# Patient Record
Sex: FEMALE | Race: WHITE | Employment: FULL TIME | URBAN - METROPOLITAN AREA
[De-identification: names, ages, dates, MRNs, and addresses within clinical notes are randomized per-mention and may not be internally consistent; named-entity substitution may affect disease eponyms.]

---

## 2017-02-17 ENCOUNTER — OFFICE VISIT (OUTPATIENT)
Dept: OBGYN CLINIC | Facility: CLINIC | Age: 42
End: 2017-02-17

## 2017-02-17 VITALS
SYSTOLIC BLOOD PRESSURE: 138 MMHG | WEIGHT: 178 LBS | DIASTOLIC BLOOD PRESSURE: 88 MMHG | HEIGHT: 65 IN | BODY MASS INDEX: 29.66 KG/M2

## 2017-02-17 DIAGNOSIS — D25.1 FIBROIDS, INTRAMURAL: Primary | ICD-10-CM

## 2017-02-17 PROBLEM — F43.10 PTSD (POST-TRAUMATIC STRESS DISORDER): Status: ACTIVE | Noted: 2017-02-17

## 2017-02-17 PROCEDURE — 88175 CYTOPATH C/V AUTO FLUID REDO: CPT | Performed by: OBSTETRICS & GYNECOLOGY

## 2017-02-17 PROCEDURE — 99204 OFFICE O/P NEW MOD 45 MIN: CPT | Performed by: OBSTETRICS & GYNECOLOGY

## 2017-02-17 PROCEDURE — 87624 HPV HI-RISK TYP POOLED RSLT: CPT | Performed by: OBSTETRICS & GYNECOLOGY

## 2017-02-17 RX ORDER — BACLOFEN 10 MG/1
TABLET ORAL
Refills: 0 | COMMUNITY
Start: 2017-01-31

## 2017-02-17 RX ORDER — MELOXICAM 7.5 MG/1
TABLET ORAL
Refills: 0 | COMMUNITY
Start: 2016-12-28 | End: 2018-02-20 | Stop reason: ALTCHOICE

## 2017-02-17 RX ORDER — FLUTICASONE PROPIONATE 250 UG/1
POWDER, METERED RESPIRATORY (INHALATION)
Refills: 5 | COMMUNITY
Start: 2017-01-18 | End: 2020-09-04

## 2017-02-17 NOTE — PROGRESS NOTES
Lily Yost is here for a checkup. She was referred to me by Dr. Maria Isabel Hernandez has PTSD and is accompanied by her psychotherapist Mr. Tristian May. Lily Yost desires him to be present during history taking, exam and consultation.     Lily Yost tenderness. My impression is that Jg Guzman has a symptomatic uterine fibroids and bilateral ovarian cysts. I discussed this with her. My recommendation would be to repeat a transvaginal ultrasound in 6 weeks to see resolution of ovarian cysts.   Since

## 2017-02-20 LAB — HPV I/H RISK 1 DNA SPEC QL NAA+PROBE: NEGATIVE

## 2017-03-03 ENCOUNTER — OFFICE VISIT (OUTPATIENT)
Dept: OTOLARYNGOLOGY | Facility: CLINIC | Age: 42
End: 2017-03-03

## 2017-03-03 VITALS
HEART RATE: 86 BPM | WEIGHT: 170 LBS | HEIGHT: 65 IN | DIASTOLIC BLOOD PRESSURE: 91 MMHG | BODY MASS INDEX: 28.32 KG/M2 | TEMPERATURE: 98 F | SYSTOLIC BLOOD PRESSURE: 133 MMHG

## 2017-03-03 DIAGNOSIS — J34.2 DEVIATED NASAL SEPTUM: ICD-10-CM

## 2017-03-03 DIAGNOSIS — H69.83 ETD (EUSTACHIAN TUBE DYSFUNCTION), BILATERAL: Primary | ICD-10-CM

## 2017-03-03 PROCEDURE — 99212 OFFICE O/P EST SF 10 MIN: CPT | Performed by: OTOLARYNGOLOGY

## 2017-03-03 PROCEDURE — 99214 OFFICE O/P EST MOD 30 MIN: CPT | Performed by: OTOLARYNGOLOGY

## 2017-03-03 RX ORDER — ASPIRIN 325 MG
325 TABLET ORAL DAILY
COMMUNITY
End: 2018-02-20 | Stop reason: ALTCHOICE

## 2017-03-03 NOTE — PROGRESS NOTES
Meghna Yi is a 39year old female. Patient presents with: Follow - Up: regarding ETD bilateral, pt is doing well       HISTORY OF PRESENT ILLNESS  Long history of chronic sinus and ear issues.  She has had ear infections throughout her entire lif Mother    • Cancer Maternal Grandmother    • Diabetes Maternal Grandfather        Past Medical History   Diagnosis Date   • White coat syndrome with high blood pressure without hypertension    • Asthma    • Arthritis    • Fibroids    • Ovarian cyst Normal Memory - Normal. Cranial nerves - Cranial nerves II through XII grossly intact.    Head/Face Normal Facial features - Normal. Eyebrows - Normal. Skull - Normal.        Nasopharynx Normal External nose - Normal. Lips/teeth/gums - Normal. Tonsils - Nor BOTH EARS BID, Disp: , Rfl: 0  •  TiZANidine HCl 4 MG Oral Tab, TK 1 T PO  D., Disp: , Rfl: 1  •  Zolpidem Tartrate (AMBIEN) 10 MG Oral Tab, , Disp: , Rfl: 5  •  Pseudoephedrine HCl (SUDAFED) 30 MG Oral Tab, Take 30 mg by mouth every 4 (four) hours as need

## 2017-03-20 ENCOUNTER — OFFICE VISIT (OUTPATIENT)
Dept: OBGYN CLINIC | Facility: CLINIC | Age: 42
End: 2017-03-20

## 2017-03-20 ENCOUNTER — APPOINTMENT (OUTPATIENT)
Dept: LAB | Facility: REFERENCE LAB | Age: 42
End: 2017-03-20
Attending: OBSTETRICS & GYNECOLOGY
Payer: COMMERCIAL

## 2017-03-20 DIAGNOSIS — D25.9 UTERINE LEIOMYOMA, UNSPECIFIED LOCATION: ICD-10-CM

## 2017-03-20 DIAGNOSIS — D25.9 UTERINE LEIOMYOMA, UNSPECIFIED LOCATION: Primary | ICD-10-CM

## 2017-03-20 DIAGNOSIS — N83.201 CYST OF RIGHT OVARY: ICD-10-CM

## 2017-03-20 LAB — CANCER AG125 SERPL-ACNC: 38 U/ML (ref 0–35)

## 2017-03-20 PROCEDURE — 86304 IMMUNOASSAY TUMOR CA 125: CPT

## 2017-03-20 PROCEDURE — 99212 OFFICE O/P EST SF 10 MIN: CPT | Performed by: OBSTETRICS & GYNECOLOGY

## 2017-03-20 PROCEDURE — 36415 COLL VENOUS BLD VENIPUNCTURE: CPT

## 2017-03-20 RX ORDER — ONDANSETRON 4 MG/1
TABLET, ORALLY DISINTEGRATING ORAL
Refills: 1 | COMMUNITY
Start: 2017-03-03 | End: 2018-02-20 | Stop reason: ALTCHOICE

## 2017-03-20 RX ORDER — ALPRAZOLAM 1 MG/1
TABLET ORAL
Refills: 5 | COMMUNITY
Start: 2017-03-10 | End: 2020-09-04

## 2017-03-20 RX ORDER — TRAZODONE HYDROCHLORIDE 100 MG/1
TABLET ORAL
Refills: 5 | COMMUNITY
Start: 2017-03-10 | End: 2020-09-04

## 2017-03-21 NOTE — PROGRESS NOTES
Jens Ronan is here following the ultrasound in our office accompanied by her therapist.  I discussed ultrasound results with her. Ultrasound shows 2.6 x 2.2 cm fibroid on the posterior aspect of the uterus. Endometrium was 13 mm.   There was a 2.7 cm x 1

## 2017-04-12 ENCOUNTER — OFFICE VISIT (OUTPATIENT)
Dept: OBGYN CLINIC | Facility: CLINIC | Age: 42
End: 2017-04-12

## 2017-04-12 ENCOUNTER — LAB ENCOUNTER (OUTPATIENT)
Dept: LAB | Facility: REFERENCE LAB | Age: 42
End: 2017-04-12
Attending: OBSTETRICS & GYNECOLOGY
Payer: COMMERCIAL

## 2017-04-12 VITALS — SYSTOLIC BLOOD PRESSURE: 122 MMHG | DIASTOLIC BLOOD PRESSURE: 80 MMHG

## 2017-04-12 DIAGNOSIS — N83.201 CYSTS OF BOTH OVARIES: ICD-10-CM

## 2017-04-12 DIAGNOSIS — Z01.812 BLOOD TESTS PRIOR TO TREATMENT OR PROCEDURE: ICD-10-CM

## 2017-04-12 DIAGNOSIS — Z01.812 BLOOD TESTS PRIOR TO TREATMENT OR PROCEDURE: Primary | ICD-10-CM

## 2017-04-12 DIAGNOSIS — N83.202 CYSTS OF BOTH OVARIES: ICD-10-CM

## 2017-04-12 PROCEDURE — 99213 OFFICE O/P EST LOW 20 MIN: CPT | Performed by: OBSTETRICS & GYNECOLOGY

## 2017-04-12 PROCEDURE — 36415 COLL VENOUS BLD VENIPUNCTURE: CPT | Performed by: OBSTETRICS & GYNECOLOGY

## 2017-04-12 PROCEDURE — 85025 COMPLETE CBC W/AUTO DIFF WBC: CPT | Performed by: OBSTETRICS & GYNECOLOGY

## 2017-04-12 NOTE — PROGRESS NOTES
Arpan Landry is here accompanied by her psychotherapist.  Patient is scheduled to have laparoscopy, possible laparotomy, ovarian cystectomies on April 21 at [facility].     Patient wants her psychotherapist present until she undergoe

## 2017-04-13 ENCOUNTER — TELEPHONE (OUTPATIENT)
Dept: OBGYN CLINIC | Facility: CLINIC | Age: 42
End: 2017-04-13

## 2017-04-13 NOTE — TELEPHONE ENCOUNTER
SPOKE WITH PATIENT REGARDING MEDICAL CLEARANCE FROM PCP WAS ONLY FOR HER WITH THE MEDICATION THAT SHE TAKE BUT SHE WAS ALL DONE ON OUR END AND READY FOR SURGERY ON THE 21 OF April

## 2017-04-13 NOTE — TELEPHONE ENCOUNTER
Has questions regarding pre op clearance. Pt states she was in office on 04/12 and Dr. Arpan Duncan did a history and physical on her.  Pt states she called her PCP and he asked her to call and make sure Dr. Arpan Duncan was giving her clearance to have surgery or did she need f

## 2017-05-01 ENCOUNTER — TELEPHONE (OUTPATIENT)
Dept: OBGYN CLINIC | Facility: CLINIC | Age: 42
End: 2017-05-01

## 2017-05-02 ENCOUNTER — OFFICE VISIT (OUTPATIENT)
Dept: OBGYN CLINIC | Facility: CLINIC | Age: 42
End: 2017-05-02

## 2017-05-02 VITALS
SYSTOLIC BLOOD PRESSURE: 130 MMHG | BODY MASS INDEX: 29.66 KG/M2 | HEIGHT: 65 IN | WEIGHT: 178 LBS | DIASTOLIC BLOOD PRESSURE: 80 MMHG

## 2017-05-02 DIAGNOSIS — Z09 POSTOP CHECK: Primary | ICD-10-CM

## 2017-05-02 PROBLEM — L76.34 POSTPROCEDURAL SEROMA OF SKIN AND SUBCUTANEOUS TISSUE FOLLOWING OTHER PROCEDURE: Status: ACTIVE | Noted: 2017-05-02

## 2017-05-02 PROCEDURE — 99024 POSTOP FOLLOW-UP VISIT: CPT | Performed by: OBSTETRICS & GYNECOLOGY

## 2017-05-02 RX ORDER — PNV NO.95/FERROUS FUM/FOLIC AC 28MG-0.8MG
1 TABLET ORAL 2 TIMES DAILY
COMMUNITY
End: 2018-02-20 | Stop reason: ALTCHOICE

## 2017-05-02 NOTE — PROGRESS NOTES
Cierra Topete is here for a checkup. Patient had called because she had little serous drainage from the middle of the incision. She denies fever, chills, nausea, vomiting.     On examination her abdominal incision is healing well there is what seems like smal

## 2017-05-03 ENCOUNTER — MED REC SCAN ONLY (OUTPATIENT)
Dept: OBGYN CLINIC | Facility: CLINIC | Age: 42
End: 2017-05-03

## 2017-05-09 ENCOUNTER — OFFICE VISIT (OUTPATIENT)
Dept: OBGYN CLINIC | Facility: CLINIC | Age: 42
End: 2017-05-09

## 2017-05-09 VITALS — DIASTOLIC BLOOD PRESSURE: 88 MMHG | SYSTOLIC BLOOD PRESSURE: 138 MMHG

## 2017-05-09 DIAGNOSIS — Z09 POSTOP CHECK: Primary | ICD-10-CM

## 2017-05-09 PROBLEM — N80.9 ENDOMETRIOSIS: Status: ACTIVE | Noted: 2017-05-09

## 2017-05-09 PROBLEM — N80.1 ENDOMETRIOMA OF OVARY: Status: ACTIVE | Noted: 2017-05-09

## 2017-05-09 PROBLEM — Z90.721 S/P RIGHT OOPHORECTOMY: Status: ACTIVE | Noted: 2017-05-09

## 2017-05-09 PROBLEM — N80.129 ENDOMETRIOMA OF OVARY: Status: ACTIVE | Noted: 2017-05-09

## 2017-05-09 PROCEDURE — 99024 POSTOP FOLLOW-UP VISIT: CPT | Performed by: OBSTETRICS & GYNECOLOGY

## 2017-05-09 NOTE — PROGRESS NOTES
Prieto Barrera is here for a checkup. She denies any fever, chills, abdominal distention. Overall she is doing very well.     On examination her abdominal incision is healing well there is slight separation in the middle of the incision about less than half a

## 2017-05-22 ENCOUNTER — OFFICE VISIT (OUTPATIENT)
Dept: OBGYN CLINIC | Facility: CLINIC | Age: 42
End: 2017-05-22

## 2017-05-22 VITALS — DIASTOLIC BLOOD PRESSURE: 74 MMHG | SYSTOLIC BLOOD PRESSURE: 130 MMHG

## 2017-05-22 DIAGNOSIS — Z09 POSTOP CHECK: Primary | ICD-10-CM

## 2017-05-22 PROCEDURE — 99024 POSTOP FOLLOW-UP VISIT: CPT | Performed by: OBSTETRICS & GYNECOLOGY

## 2017-05-22 NOTE — PROGRESS NOTES
Prieto Barrera is here for a check up. She is accompanied by her Psychotherapist.    She has no complaints. She denies fever, chills, incisional drainage. On examination the incision is healing well plan is to see her back in the office again in 3 months.

## 2017-06-30 ENCOUNTER — HOSPITAL ENCOUNTER (OUTPATIENT)
Age: 42
Discharge: HOME | End: 2017-06-30
Attending: EMERGENCY MEDICINE
Payer: COMMERCIAL

## 2017-06-30 ENCOUNTER — APPOINTMENT (OUTPATIENT)
Dept: GENERAL RADIOLOGY | Age: 42
End: 2017-06-30
Attending: EMERGENCY MEDICINE
Payer: COMMERCIAL

## 2017-06-30 VITALS
TEMPERATURE: 98 F | WEIGHT: 175 LBS | DIASTOLIC BLOOD PRESSURE: 86 MMHG | BODY MASS INDEX: 28.13 KG/M2 | HEIGHT: 66 IN | HEART RATE: 80 BPM | OXYGEN SATURATION: 98 % | SYSTOLIC BLOOD PRESSURE: 136 MMHG | RESPIRATION RATE: 18 BRPM

## 2017-06-30 DIAGNOSIS — S93.602A FOOT SPRAIN, LEFT, INITIAL ENCOUNTER: Primary | ICD-10-CM

## 2017-06-30 PROCEDURE — 73630 X-RAY EXAM OF FOOT: CPT | Performed by: EMERGENCY MEDICINE

## 2017-06-30 PROCEDURE — 99204 OFFICE O/P NEW MOD 45 MIN: CPT

## 2017-06-30 PROCEDURE — 99213 OFFICE O/P EST LOW 20 MIN: CPT

## 2017-06-30 RX ORDER — LIDOCAINE 50 MG/G
1 PATCH TOPICAL EVERY 24 HOURS
Qty: 6 PATCH | Refills: 0 | Status: SHIPPED | OUTPATIENT
Start: 2017-06-30 | End: 2018-02-20 | Stop reason: ALTCHOICE

## 2017-06-30 RX ORDER — LIDOCAINE 50 MG/G
1 PATCH TOPICAL EVERY 24 HOURS
Qty: 6 PATCH | Refills: 0
Start: 2017-06-30

## 2017-06-30 NOTE — ED INITIAL ASSESSMENT (HPI)
Pt reporting pain in left foot after stepping off a curb on June 21st. States she has been icing foot and taking arthritis meds without relief.

## 2017-06-30 NOTE — ED PROVIDER NOTES
Patient Seen in: [address]    History   Patient presents with:  Musculoskeletal Problem    Stated Complaint: lt foot pain    HPI    The patient is a 42-year-old female with a history of osteoarthritis presents with comp PRN   TraZODone HCl 100 MG Oral Tab,  TK 1 T PO QPM   Meloxicam 7.5 MG Oral Tab,  TK 1 T PO  QD   Diclofenac Sodium 1 % Transdermal Gel,  KYRIE UTD AA TID   baclofen 10 MG Oral Tab,  TK 1 T PO  Q 8 H PRN   PROAIR  (90 BASE) MCG/ACT Inhalation Aero Sol other systems reviewed and negative except as noted above. PSFH elements reviewed from today and agreed except as otherwise stated in HPI.     Physical Exam   ED Triage Vitals [06/30/17 1055]  BP: (!) 151/102  Pulse: 75  Resp: 18  Temp: 98.4 °F (36.9 °C) Discharge Medication List    START taking these medications    lidocaine 5 % External Patch  Place 1 patch onto the skin daily.   Qty: 6 patch Refills: 0

## 2017-06-30 NOTE — ED NOTES
Pt left clinic without copy of foot xray. Phone call placed to pt informing her copy of disc is at RN station for her to .  Left message on pt cell phone

## 2017-08-01 ENCOUNTER — OFFICE VISIT (OUTPATIENT)
Dept: OBGYN CLINIC | Facility: CLINIC | Age: 42
End: 2017-08-01

## 2017-08-01 VITALS — SYSTOLIC BLOOD PRESSURE: 118 MMHG | DIASTOLIC BLOOD PRESSURE: 76 MMHG

## 2017-08-01 DIAGNOSIS — N93.9 ABNORMAL UTERINE BLEEDING: Primary | ICD-10-CM

## 2017-08-01 PROCEDURE — 99212 OFFICE O/P EST SF 10 MIN: CPT | Performed by: OBSTETRICS & GYNECOLOGY

## 2018-02-20 ENCOUNTER — OFFICE VISIT (OUTPATIENT)
Dept: OTOLARYNGOLOGY | Facility: CLINIC | Age: 43
End: 2018-02-20

## 2018-02-20 VITALS
TEMPERATURE: 98 F | SYSTOLIC BLOOD PRESSURE: 115 MMHG | WEIGHT: 160 LBS | BODY MASS INDEX: 26.66 KG/M2 | DIASTOLIC BLOOD PRESSURE: 80 MMHG | HEIGHT: 65 IN

## 2018-02-20 DIAGNOSIS — H61.21 CERUMEN DEBRIS ON TYMPANIC MEMBRANE OF RIGHT EAR: ICD-10-CM

## 2018-02-20 DIAGNOSIS — H69.83 ETD (EUSTACHIAN TUBE DYSFUNCTION), BILATERAL: Primary | ICD-10-CM

## 2018-02-20 DIAGNOSIS — J34.2 DEVIATED NASAL SEPTUM: ICD-10-CM

## 2018-02-20 PROCEDURE — 99214 OFFICE O/P EST MOD 30 MIN: CPT | Performed by: OTOLARYNGOLOGY

## 2018-02-20 PROCEDURE — 92504 EAR MICROSCOPY EXAMINATION: CPT | Performed by: OTOLARYNGOLOGY

## 2018-02-20 PROCEDURE — 99212 OFFICE O/P EST SF 10 MIN: CPT | Performed by: OTOLARYNGOLOGY

## 2018-02-20 RX ORDER — MONTELUKAST SODIUM 10 MG/1
10 TABLET ORAL NIGHTLY
Qty: 30 TABLET | Refills: 3 | Status: SHIPPED | OUTPATIENT
Start: 2018-02-20 | End: 2018-06-10

## 2018-02-20 RX ORDER — METAXALONE 800 MG/1
TABLET ORAL
Refills: 6 | COMMUNITY
Start: 2018-02-16 | End: 2020-09-04 | Stop reason: ALTCHOICE

## 2018-02-20 RX ORDER — LORATADINE 10 MG/1
10 TABLET ORAL DAILY
Qty: 30 TABLET | Refills: 3 | Status: SHIPPED | OUTPATIENT
Start: 2018-02-20 | End: 2018-10-30

## 2018-02-20 NOTE — PROGRESS NOTES
Irlanda Young is a 43year old female. Patient presents with:  Ear Problem: possible bilateral ear infections per Dr Nick Starks, pt is currently using ear drops       HISTORY OF PRESENT ILLNESS  Long history of chronic sinus and ear issues.  She has had Spouse name:                       Years of education:                 Number of children:               Social History Main Topics    Smoking status: Never Smoker                                                                Smokeless tobacco: Never Us syncope. GI Negative Abdominal pain and diarrhea. Endocrine Negative Cold intolerance and heat intolerance. Neuro Negative Tremors. Psych Negative Anxiety and depression. Integumentary Negative Frequent skin infections, pigment change and rash. Nasal mucosa–congested   Microscopy  Binocular microscopy was performed. The affected ear(s) was/were examined and all debris removed using suction.  The findings are described in the physical exam.   All moist debris removed from the right tympanic membran mouth daily. , Disp: 30 tablet, Rfl: 3  ASSESSMENT AND PLAN    1. ETD (Eustachian tube dysfunction), bilateral    2. Deviated nasal septum  Debris removed from the right tympanic membrane around the tube. Both tubes are in place and patent.   Small collar o

## 2018-04-27 ENCOUNTER — OFFICE VISIT (OUTPATIENT)
Dept: OTOLARYNGOLOGY | Facility: CLINIC | Age: 43
End: 2018-04-27

## 2018-04-27 VITALS
BODY MASS INDEX: 26.34 KG/M2 | TEMPERATURE: 97 F | SYSTOLIC BLOOD PRESSURE: 141 MMHG | DIASTOLIC BLOOD PRESSURE: 81 MMHG | HEIGHT: 65.5 IN | WEIGHT: 160 LBS

## 2018-04-27 DIAGNOSIS — J34.2 DEVIATED NASAL SEPTUM: Primary | ICD-10-CM

## 2018-04-27 DIAGNOSIS — L29.9 EAR ITCHING: ICD-10-CM

## 2018-04-27 PROCEDURE — 99214 OFFICE O/P EST MOD 30 MIN: CPT | Performed by: OTOLARYNGOLOGY

## 2018-04-27 PROCEDURE — 99212 OFFICE O/P EST SF 10 MIN: CPT | Performed by: OTOLARYNGOLOGY

## 2018-04-27 RX ORDER — BETAMETHASONE DIPROPIONATE 0.5 MG/G
OINTMENT TOPICAL
Qty: 1 TUBE | Refills: 0 | Status: SHIPPED | OUTPATIENT
Start: 2018-04-27

## 2018-04-27 RX ORDER — PSEUDOEPHEDRINE HCL 120 MG/1
120 TABLET, FILM COATED, EXTENDED RELEASE ORAL EVERY 12 HOURS
Qty: 60 TABLET | Refills: 3 | Status: SHIPPED | OUTPATIENT
Start: 2018-04-27 | End: 2020-09-04 | Stop reason: ALTCHOICE

## 2018-04-27 NOTE — PROGRESS NOTES
Shira Silveira is a 43year old female.   Patient presents with:  Sinus Problem: facial pressure, nasal congestion, itchy ears, and scratchy throat       HISTORY OF PRESENT ILLNESS    HISTORY OF PRESENT ILLNESS  Long history of chronic sinus and ear iss on Singulair and Claritin and she has been using fluticasone. Continue nasal congestion and postnasal drip with scratchiness and irritation to her throat. She is throat clearing. Occasional cough with some voice changes. Allergies? I suspect yes.   Pre Comment: ovarian tumor removed   No date: PHYSICAL THERAPY TREATMENT      Comment: x2 days a week  04/21/2017: SALPINGECTOMY Bilateral      Comment: right/left salpingectomy and left ovarian                cystectomy  No date: T&A  No date: TONSILLECTOMY Detail Normal Submental. Submandibular. Anterior cervical. Posterior cervical. Supraclavicular.         Nose/Mouth/Throat Normal External nose - Normal. Lips/teeth/gums - Normal. Tonsils - Normal. Oropharynx - Normal.   Nose/Mouth/Throat Normal Nares - Righ Fluticasone Propionate (FLONASE) 50 MCG/ACT Nasal Suspension, , Disp: , Rfl: 11  •  Montelukast Sodium (SINGULAIR) 10 MG Oral Tab, Take 1 tablet (10 mg total) by mouth nightly., Disp: 30 tablet, Rfl: 3  •  loratadine (CLARITIN) 10 MG Oral Tab, Take 1 table

## 2018-06-05 ENCOUNTER — LAB ENCOUNTER (OUTPATIENT)
Dept: LAB | Age: 43
End: 2018-06-05
Attending: ORTHOPAEDIC SURGERY
Payer: COMMERCIAL

## 2018-06-05 DIAGNOSIS — M25.562 LEFT KNEE PAIN: Primary | ICD-10-CM

## 2018-06-05 PROCEDURE — 86140 C-REACTIVE PROTEIN: CPT

## 2018-06-05 PROCEDURE — 36415 COLL VENOUS BLD VENIPUNCTURE: CPT

## 2018-06-05 PROCEDURE — 85652 RBC SED RATE AUTOMATED: CPT

## 2018-06-05 PROCEDURE — 85025 COMPLETE CBC W/AUTO DIFF WBC: CPT

## 2018-06-18 RX ORDER — MONTELUKAST SODIUM 10 MG/1
TABLET ORAL
Qty: 30 TABLET | Refills: 0 | Status: SHIPPED | OUTPATIENT
Start: 2018-06-18 | End: 2018-10-30

## 2018-07-17 ENCOUNTER — OFFICE VISIT (OUTPATIENT)
Dept: OTOLARYNGOLOGY | Facility: CLINIC | Age: 43
End: 2018-07-17

## 2018-07-17 VITALS
SYSTOLIC BLOOD PRESSURE: 116 MMHG | HEIGHT: 65 IN | DIASTOLIC BLOOD PRESSURE: 64 MMHG | BODY MASS INDEX: 27.49 KG/M2 | TEMPERATURE: 98 F | WEIGHT: 165 LBS

## 2018-07-17 DIAGNOSIS — H69.83 ETD (EUSTACHIAN TUBE DYSFUNCTION), BILATERAL: ICD-10-CM

## 2018-07-17 DIAGNOSIS — J34.2 DEVIATED NASAL SEPTUM: Primary | ICD-10-CM

## 2018-07-17 PROCEDURE — 99214 OFFICE O/P EST MOD 30 MIN: CPT | Performed by: OTOLARYNGOLOGY

## 2018-07-17 PROCEDURE — 99212 OFFICE O/P EST SF 10 MIN: CPT | Performed by: OTOLARYNGOLOGY

## 2018-07-17 RX ORDER — PSEUDOEPHEDRINE HCL 120 MG/1
120 TABLET, FILM COATED, EXTENDED RELEASE ORAL EVERY 12 HOURS
Qty: 60 TABLET | Refills: 3 | Status: SHIPPED | OUTPATIENT
Start: 2018-07-17 | End: 2018-10-30

## 2018-07-17 RX ORDER — MONTELUKAST SODIUM 10 MG/1
10 TABLET ORAL NIGHTLY
Qty: 30 TABLET | Refills: 3 | Status: SHIPPED | OUTPATIENT
Start: 2018-07-17 | End: 2018-10-30

## 2018-07-17 NOTE — PROGRESS NOTES
Mannie Walls is a 37year old female. Patient presents with: Follow - Up: ear itching, deviated septum, sinus feel a little better,  itchy ears comes less often      HISTORY OF PRESENT ILLNESS  Long history of chronic sinus and ear issues.  She has and Claritin and she has been using fluticasone. Continue nasal congestion and postnasal drip with scratchiness and irritation to her throat. She is throat clearing. Occasional cough with some voice changes. Allergies? I suspect yes.   Previously noted UNLISTED Left      Comment: left thumb and elbow   No date: HC IMPLANT EAR TUBES      Comment: multiple   No date: KNEE SURGERY Bilateral  04/21/2017: LAPAROSCOPY,PELVIC,BIOPSY      Comment: Post op laparotomy and rt oophorectomy and                salping through XII grossly intact.    Head/Face Normal Facial features - Normal. Eyebrows - Normal. Skull - Normal.        Nasopharynx Normal External nose - Normal. Lips/teeth/gums - Normal. Tonsils - Normal. Oropharynx - Normal.   Ears Normal Inspection - Right: EVERY 6 HOURS AS NEEDED., Disp: , Rfl: 0  •  hydrocodone-acetaminophen 7.5-325 MG Oral Tab, TK 2 TS PO QHS, Disp: , Rfl: 0  •  TraMADol HCl 50 MG Oral Tab, Take 50 mg by mouth every 6 (six) hours as needed for Pain., Disp: , Rfl:   •  alprazolam (XANAX) 0.

## 2018-10-05 ENCOUNTER — OFFICE VISIT (OUTPATIENT)
Dept: OTOLARYNGOLOGY | Facility: CLINIC | Age: 43
End: 2018-10-05
Payer: COMMERCIAL

## 2018-10-05 VITALS
SYSTOLIC BLOOD PRESSURE: 121 MMHG | DIASTOLIC BLOOD PRESSURE: 83 MMHG | HEIGHT: 65 IN | BODY MASS INDEX: 24.99 KG/M2 | WEIGHT: 150 LBS

## 2018-10-05 DIAGNOSIS — J34.2 DEVIATED NASAL SEPTUM: Primary | ICD-10-CM

## 2018-10-05 PROCEDURE — 99212 OFFICE O/P EST SF 10 MIN: CPT | Performed by: OTOLARYNGOLOGY

## 2018-10-05 PROCEDURE — 99214 OFFICE O/P EST MOD 30 MIN: CPT | Performed by: OTOLARYNGOLOGY

## 2018-10-17 NOTE — PROGRESS NOTES
Vielka Al is a 37year old female. Patient presents with: Follow - Up: deviated septum: would like to discuss surgery      HISTORY OF PRESENT ILLNESS  Long history of chronic sinus and ear issues.  She has had ear infections throughout her entire fluticasone.  Continue nasal congestion and postnasal drip with scratchiness and irritation to her throat.  She is throat clearing.  Occasional cough with some voice changes.  Allergies?  I suspect yes.  Previously noted to have a deviation of her septum t Concerns:        Not on file    Social History Narrative      Not on file      Family History   Problem Relation Age of Onset   • Heart Disorder Father    • Diabetes Father    • Other (Other) Mother    • Cancer Maternal Grandmother    • Diabetes Maternal G (1.651 m)   Wt 150 lb (68 kg)   BMI 24.96 kg/m²        Constitutional Normal Overall appearance - Normal.   Psychiatric Normal Orientation - Oriented to time, place, person & situation. Appropriate mood and affect.    Neck Exam Normal Inspection - Normal. P Rfl: 5  •  FLOVENT DISKUS 250 MCG/BLIST Inhalation Aerosol Powder, Breath Activated, INL 1 PUFF PO BID UTD, Disp: , Rfl: 5  •  Diclofenac Sodium 1 % Transdermal Gel, KYRIE UTD AA TID, Disp: , Rfl: 3  •  baclofen 10 MG Oral Tab, TK 1 T PO  Q 8 H PRN, Disp: , MD    10/17/2018    9:26 AM

## 2018-10-27 ENCOUNTER — HOSPITAL ENCOUNTER (OUTPATIENT)
Age: 43
Discharge: HOME | End: 2018-10-27
Attending: EMERGENCY MEDICINE
Payer: COMMERCIAL

## 2018-10-27 VITALS
HEIGHT: 65 IN | RESPIRATION RATE: 22 BRPM | DIASTOLIC BLOOD PRESSURE: 83 MMHG | HEART RATE: 85 BPM | OXYGEN SATURATION: 99 % | BODY MASS INDEX: 25 KG/M2 | TEMPERATURE: 98 F | SYSTOLIC BLOOD PRESSURE: 139 MMHG

## 2018-10-27 DIAGNOSIS — J01.90 ACUTE NON-RECURRENT SINUSITIS, UNSPECIFIED LOCATION: Primary | ICD-10-CM

## 2018-10-27 PROCEDURE — 99213 OFFICE O/P EST LOW 20 MIN: CPT

## 2018-10-27 PROCEDURE — 99214 OFFICE O/P EST MOD 30 MIN: CPT

## 2018-10-27 RX ORDER — CLARITHROMYCIN 500 MG/1
500 TABLET, COATED ORAL 2 TIMES DAILY
Qty: 20 TABLET | Refills: 0 | Status: SHIPPED | OUTPATIENT
Start: 2018-10-27 | End: 2018-11-06

## 2018-10-27 RX ORDER — CLARITHROMYCIN 500 MG/1
500 TABLET, COATED ORAL 2 TIMES DAILY
Qty: 20 TABLET | Refills: 0 | Status: SHIPPED | OUTPATIENT
Start: 2018-10-27 | End: 2018-10-27

## 2018-10-27 NOTE — ED INITIAL ASSESSMENT (HPI)
Pt states starting with cough this past Wednesday, and now developed into head congestion. Pt has had a mild fever Thursday and Friday. Pt denies NVD.

## 2018-10-27 NOTE — ED NOTES
Pt discharged to care of self. Pt assessed by MD. New medications and after care discussed, all questions answered. Pt confirmed understanding.

## 2018-10-27 NOTE — ED PROVIDER NOTES
Patient Seen in: [address]    History   Patient presents with:  Cough/URI    Stated Complaint: congestion; mucus    HPI    15-year-old female patient presents her complaining of cough and congestion with productive sput 85   Resp 22   Temp 97.7 °F (36.5 °C)   Temp src Oral   SpO2 99 %   O2 Device None (Room air)       Current:/83   Pulse 85   Temp 97.7 °F (36.5 °C) (Oral)   Resp 22   Ht 165.1 cm (5' 5\")   SpO2 99%   BMI 24.96 kg/m²         Physical Exam    Gen: Regine

## 2018-11-01 ENCOUNTER — TELEPHONE (OUTPATIENT)
Dept: OTOLARYNGOLOGY | Facility: CLINIC | Age: 43
End: 2018-11-01

## 2018-11-01 NOTE — TELEPHONE ENCOUNTER
Per pt feeling better, pt states still has cough with phlegm - clear in color. Pt has been on antibiotics, no fever or other symptoms. Per , pt to reschedule surgery for Monday and pt to wait at least a month to reschedule.  Pt transferred surgery

## 2018-11-05 RX ORDER — MONTELUKAST SODIUM 10 MG/1
TABLET ORAL
Qty: 30 TABLET | Refills: 3 | Status: SHIPPED | OUTPATIENT
Start: 2018-11-05 | End: 2020-09-04

## 2018-12-26 ENCOUNTER — APPOINTMENT (OUTPATIENT)
Dept: GENERAL RADIOLOGY | Age: 43
End: 2018-12-26
Attending: EMERGENCY MEDICINE
Payer: COMMERCIAL

## 2018-12-26 ENCOUNTER — HOSPITAL ENCOUNTER (OUTPATIENT)
Age: 43
Discharge: HOME | End: 2018-12-26
Attending: EMERGENCY MEDICINE
Payer: COMMERCIAL

## 2018-12-26 VITALS
DIASTOLIC BLOOD PRESSURE: 92 MMHG | OXYGEN SATURATION: 100 % | SYSTOLIC BLOOD PRESSURE: 126 MMHG | HEART RATE: 79 BPM | TEMPERATURE: 98 F | RESPIRATION RATE: 18 BRPM | WEIGHT: 165 LBS | BODY MASS INDEX: 27.49 KG/M2 | HEIGHT: 65 IN

## 2018-12-26 DIAGNOSIS — S93.401A MODERATE RIGHT ANKLE SPRAIN, INITIAL ENCOUNTER: Primary | ICD-10-CM

## 2018-12-26 PROCEDURE — 99213 OFFICE O/P EST LOW 20 MIN: CPT

## 2018-12-26 PROCEDURE — 73610 X-RAY EXAM OF ANKLE: CPT | Performed by: EMERGENCY MEDICINE

## 2018-12-26 NOTE — ED PROVIDER NOTES
Patient Seen in: [facility]    History   Patient presents with: Ankle Injury    Stated Complaint: ANKLE INJURY    HPI    45-year-old female presents for complaint of right ankle pain.   Patient reports that she was getti salpingectomy and left ovarian cystectomy   • T&A     • TONSILLECTOMY         Family history reviewed and is not pertinent to presenting problem.     Social History    Tobacco Use      Smoking status: Never Smoker      Smokeless tobacco: Never Used    [employer] normal capillary refill, no deformity and no swelling. Normal sensation noted. Decreased sensation is not present in the radial distribution. Normal strength noted.         Right lower leg: Normal.        Left lower leg: Normal.        Right foot: Normal. Clinical Impression:  Moderate right ankle sprain, initial encounter  (primary encounter diagnosis)    Disposition:  Discharge  12/26/2018  6:11 pm    Follow-up:  Gregory Kruse [address]  [phone]    Sched

## 2018-12-26 NOTE — ED INITIAL ASSESSMENT (HPI)
Pt c/o right ankle injury occurred today. She reports she was walking and she rolled ankle. She reports swelling and pain.

## 2018-12-27 NOTE — ED NOTES
Pt discharged home in good condition. Reviewed otc pain meds, splint, and avs. Follow up as indicated. Pt verbalized understanding and agreed.

## 2019-02-01 ENCOUNTER — LAB ENCOUNTER (OUTPATIENT)
Dept: LAB | Age: 44
End: 2019-02-01
Attending: FAMILY MEDICINE
Payer: COMMERCIAL

## 2019-02-01 DIAGNOSIS — R79.89 HYPOURICEMIA: ICD-10-CM

## 2019-02-01 DIAGNOSIS — Z79.899 ENCOUNTER FOR LONG-TERM (CURRENT) USE OF OTHER MEDICATIONS: Primary | ICD-10-CM

## 2019-02-01 LAB
ALBUMIN SERPL BCP-MCNC: 3.8 G/DL (ref 3.5–4.8)
ALBUMIN/GLOB SERPL: 1.3 {RATIO} (ref 1–2)
ALP SERPL-CCNC: 68 U/L (ref 32–100)
ALT SERPL-CCNC: 13 U/L (ref 14–54)
ANION GAP SERPL CALC-SCNC: 8 MMOL/L (ref 0–18)
AST SERPL-CCNC: 17 U/L (ref 15–41)
BASOPHILS # BLD AUTO: 0.06 X10(3) UL (ref 0–0.2)
BASOPHILS NFR BLD AUTO: 0.6 %
BILIRUB SERPL-MCNC: 0.4 MG/DL (ref 0.3–1.2)
BUN SERPL-MCNC: 7 MG/DL (ref 8–20)
BUN/CREAT SERPL: 10.4 (ref 10–20)
CALCIUM SERPL-MCNC: 9 MG/DL (ref 8.5–10.5)
CHLORIDE SERPL-SCNC: 110 MMOL/L (ref 95–110)
CO2 SERPL-SCNC: 21 MMOL/L (ref 22–32)
CREAT SERPL-MCNC: 0.67 MG/DL (ref 0.5–1.5)
DEPRECATED RDW RBC AUTO: 46.9 FL (ref 35.1–46.3)
EOSINOPHIL # BLD AUTO: 0.09 X10(3) UL (ref 0–0.7)
EOSINOPHIL NFR BLD AUTO: 0.9 %
ERYTHROCYTE [DISTWIDTH] IN BLOOD BY AUTOMATED COUNT: 14.6 % (ref 11–15)
GLOBULIN PLAS-MCNC: 2.9 G/DL (ref 2.5–3.7)
GLUCOSE SERPL-MCNC: 104 MG/DL (ref 70–99)
HCT VFR BLD AUTO: 34.6 % (ref 35–48)
HGB BLD-MCNC: 11.1 G/DL (ref 12–16)
IMM GRANULOCYTES # BLD AUTO: 0.02 X10(3) UL (ref 0–1)
IMM GRANULOCYTES NFR BLD: 0.2 %
LYMPHOCYTES # BLD AUTO: 2.67 X10(3) UL (ref 1–4)
LYMPHOCYTES NFR BLD AUTO: 28.1 %
MCH RBC QN AUTO: 28 PG (ref 26–34)
MCHC RBC AUTO-ENTMCNC: 32.1 G/DL (ref 31–37)
MCV RBC AUTO: 87.4 FL (ref 80–100)
MONOCYTES # BLD AUTO: 1.05 X10(3) UL (ref 0.1–1)
MONOCYTES NFR BLD AUTO: 11.1 %
NEUTROPHILS # BLD AUTO: 5.6 X10 (3) UL (ref 1.5–7.7)
NEUTROPHILS # BLD AUTO: 5.6 X10(3) UL (ref 1.5–7.7)
NEUTROPHILS NFR BLD AUTO: 59.1 %
OSMOLALITY UR CALC.SUM OF ELEC: 286 MOSM/KG (ref 275–295)
PATIENT FASTING: NO
PLATELET # BLD AUTO: 336 10(3)UL (ref 150–450)
POTASSIUM SERPL-SCNC: 3.5 MMOL/L (ref 3.3–5.1)
PROT SERPL-MCNC: 6.7 G/DL (ref 5.9–8.4)
RBC # BLD AUTO: 3.96 X10(6)UL (ref 3.8–5.3)
SODIUM SERPL-SCNC: 139 MMOL/L (ref 136–144)
WBC # BLD AUTO: 9.5 X10(3) UL (ref 4–11)

## 2019-02-01 PROCEDURE — 85025 COMPLETE CBC W/AUTO DIFF WBC: CPT

## 2019-02-01 PROCEDURE — 80053 COMPREHEN METABOLIC PANEL: CPT

## 2019-02-01 PROCEDURE — 36415 COLL VENOUS BLD VENIPUNCTURE: CPT

## 2019-02-15 ENCOUNTER — LAB ENCOUNTER (OUTPATIENT)
Dept: LAB | Age: 44
End: 2019-02-15
Attending: FAMILY MEDICINE
Payer: COMMERCIAL

## 2019-02-15 DIAGNOSIS — R79.9 ABNORMAL BLOOD CHEMISTRY: ICD-10-CM

## 2019-02-15 DIAGNOSIS — D64.9 ANEMIA: Primary | ICD-10-CM

## 2019-02-15 LAB
ALBUMIN SERPL-MCNC: 3.7 G/DL (ref 3.4–5)
ALBUMIN/GLOB SERPL: 1.1 {RATIO} (ref 1–2)
ALP LIVER SERPL-CCNC: 81 U/L (ref 37–98)
ALT SERPL-CCNC: 17 U/L (ref 13–56)
ANION GAP SERPL CALC-SCNC: 5 MMOL/L (ref 0–18)
AST SERPL-CCNC: 11 U/L (ref 15–37)
BASOPHILS # BLD AUTO: 0.06 X10(3) UL (ref 0–0.2)
BASOPHILS NFR BLD AUTO: 0.9 %
BILIRUB SERPL-MCNC: 0.2 MG/DL (ref 0.1–2)
BUN BLD-MCNC: 8 MG/DL (ref 7–18)
BUN/CREAT SERPL: 10.4 (ref 10–20)
CALCIUM BLD-MCNC: 8.6 MG/DL (ref 8.5–10.1)
CHLORIDE SERPL-SCNC: 112 MMOL/L (ref 98–107)
CO2 SERPL-SCNC: 24 MMOL/L (ref 21–32)
CREAT BLD-MCNC: 0.77 MG/DL (ref 0.55–1.02)
DEPRECATED RDW RBC AUTO: 44.5 FL (ref 35.1–46.3)
EOSINOPHIL # BLD AUTO: 0.11 X10(3) UL (ref 0–0.7)
EOSINOPHIL NFR BLD AUTO: 1.7 %
ERYTHROCYTE [DISTWIDTH] IN BLOOD BY AUTOMATED COUNT: 14.1 % (ref 11–15)
GLOBULIN PLAS-MCNC: 3.5 G/DL (ref 2.8–4.4)
GLUCOSE BLD-MCNC: 105 MG/DL (ref 70–99)
HCT VFR BLD AUTO: 35.5 % (ref 35–48)
HGB BLD-MCNC: 11.2 G/DL (ref 12–16)
IMM GRANULOCYTES # BLD AUTO: 0.01 X10(3) UL (ref 0–1)
IMM GRANULOCYTES NFR BLD: 0.2 %
LYMPHOCYTES # BLD AUTO: 2.56 X10(3) UL (ref 1–4)
LYMPHOCYTES NFR BLD AUTO: 38.7 %
M PROTEIN MFR SERPL ELPH: 7.2 G/DL (ref 6.4–8.2)
MCH RBC QN AUTO: 27.3 PG (ref 26–34)
MCHC RBC AUTO-ENTMCNC: 31.5 G/DL (ref 31–37)
MCV RBC AUTO: 86.4 FL (ref 80–100)
MONOCYTES # BLD AUTO: 0.82 X10(3) UL (ref 0.1–1)
MONOCYTES NFR BLD AUTO: 12.4 %
NEUTROPHILS # BLD AUTO: 3.06 X10 (3) UL (ref 1.5–7.7)
NEUTROPHILS # BLD AUTO: 3.06 X10(3) UL (ref 1.5–7.7)
NEUTROPHILS NFR BLD AUTO: 46.1 %
OSMOLALITY SERPL CALC.SUM OF ELEC: 291 MOSM/KG (ref 275–295)
PLATELET # BLD AUTO: 292 10(3)UL (ref 150–450)
POTASSIUM SERPL-SCNC: 4.2 MMOL/L (ref 3.5–5.1)
RBC # BLD AUTO: 4.11 X10(6)UL (ref 3.8–5.3)
SODIUM SERPL-SCNC: 141 MMOL/L (ref 136–145)
WBC # BLD AUTO: 6.6 X10(3) UL (ref 4–11)

## 2019-02-15 PROCEDURE — 85025 COMPLETE CBC W/AUTO DIFF WBC: CPT

## 2019-02-15 PROCEDURE — 36415 COLL VENOUS BLD VENIPUNCTURE: CPT

## 2019-02-15 PROCEDURE — 80053 COMPREHEN METABOLIC PANEL: CPT

## 2019-04-16 ENCOUNTER — OFFICE VISIT (OUTPATIENT)
Dept: OBGYN CLINIC | Facility: CLINIC | Age: 44
End: 2019-04-16
Payer: COMMERCIAL

## 2019-04-16 VITALS — DIASTOLIC BLOOD PRESSURE: 80 MMHG | SYSTOLIC BLOOD PRESSURE: 118 MMHG | HEIGHT: 65 IN | BODY MASS INDEX: 27 KG/M2

## 2019-04-16 DIAGNOSIS — D21.9 FIBROID: ICD-10-CM

## 2019-04-16 DIAGNOSIS — N83.202 CYST OF LEFT OVARY: ICD-10-CM

## 2019-04-16 DIAGNOSIS — N93.9 ABNORMAL UTERINE BLEEDING: Primary | ICD-10-CM

## 2019-04-16 PROCEDURE — 87624 HPV HI-RISK TYP POOLED RSLT: CPT | Performed by: OBSTETRICS & GYNECOLOGY

## 2019-04-16 PROCEDURE — 99213 OFFICE O/P EST LOW 20 MIN: CPT | Performed by: OBSTETRICS & GYNECOLOGY

## 2019-04-16 RX ORDER — TOPIRAMATE 25 MG/1
TABLET ORAL
Refills: 3 | COMMUNITY
Start: 2019-01-16 | End: 2020-09-04 | Stop reason: ALTCHOICE

## 2019-04-16 RX ORDER — IBUPROFEN 400 MG/1
400 TABLET ORAL
COMMUNITY
End: 2019-08-23 | Stop reason: ALTCHOICE

## 2019-04-16 NOTE — PROGRESS NOTES
Syl Corrales is here for a check up. She is accompanied by her Psychotherapist.  I am not seen her since August 3, 2017. Patient has had laparotomy right salpingo-oophorectomy, left salpingo-oophorectomy and left ovarian cystectomy.     Patient had severe end topiramate 25 MG Oral Tab TK 2 TS PO BID Disp:  Rfl: 3   MONTELUKAST SODIUM 10 MG Oral Tab TAKE 1 TABLET(10 MG) BY MOUTH EVERY NIGHT Disp: 30 tablet Rfl: 3   Pseudoephedrine HCl  MG Oral Tablet 12 Hr Take 1 tablet (120 mg total) by mouth every 12 ( ASSESSMENT/PLAN:   Assessment     1. Abnormal uterine bleeding    2. Cyst of left ovary    3.  Fibroid         ASSESSMENT:      Small uterine fibroid, simple cyst on MRI of left ovary    PLAN:     I gave her a prescription for mammogram also recommend

## 2019-05-31 ENCOUNTER — ULTRASOUND ENCOUNTER (OUTPATIENT)
Dept: OBGYN CLINIC | Facility: CLINIC | Age: 44
End: 2019-05-31
Payer: COMMERCIAL

## 2019-05-31 ENCOUNTER — OFFICE VISIT (OUTPATIENT)
Dept: OBGYN CLINIC | Facility: CLINIC | Age: 44
End: 2019-05-31
Payer: COMMERCIAL

## 2019-05-31 VITALS — HEIGHT: 65 IN | BODY MASS INDEX: 27 KG/M2

## 2019-05-31 DIAGNOSIS — N93.9 ABNORMAL UTERINE BLEEDING: ICD-10-CM

## 2019-05-31 DIAGNOSIS — N83.202 CYST OF LEFT OVARY: Primary | ICD-10-CM

## 2019-05-31 DIAGNOSIS — D21.9 FIBROID: ICD-10-CM

## 2019-05-31 DIAGNOSIS — N83.202 CYST OF LEFT OVARY: ICD-10-CM

## 2019-05-31 PROBLEM — M41.129 SCOLIOSIS, ADOLESCENT ACQUIRED: Status: ACTIVE | Noted: 2019-05-31

## 2019-05-31 PROBLEM — M18.0 OSTEOARTHRITIS OF CARPOMETACARPAL JOINTS OF BOTH THUMBS: Status: ACTIVE | Noted: 2019-05-31

## 2019-05-31 PROBLEM — F41.9 ANXIETY: Status: ACTIVE | Noted: 2019-05-31

## 2019-05-31 PROCEDURE — 76856 US EXAM PELVIC COMPLETE: CPT | Performed by: OBSTETRICS & GYNECOLOGY

## 2019-05-31 PROCEDURE — 76830 TRANSVAGINAL US NON-OB: CPT | Performed by: OBSTETRICS & GYNECOLOGY

## 2019-05-31 PROCEDURE — 99213 OFFICE O/P EST LOW 20 MIN: CPT | Performed by: OBSTETRICS & GYNECOLOGY

## 2019-05-31 RX ORDER — HYDROCODONE BITARTRATE AND ACETAMINOPHEN 5; 325 MG/1; MG/1
TABLET ORAL
Refills: 0 | COMMUNITY
Start: 2019-05-29 | End: 2020-09-04

## 2019-05-31 NOTE — PROGRESS NOTES
Mary Cisse is here following ultrasound in our office for a consult. She is accompanied by her psychotherapist.  Her last menstrual cycle was 2 weeks ago and was light. Patient says that every other cycle has been very heavy.     Her ultrasound in our offi

## 2019-06-06 ENCOUNTER — LAB ENCOUNTER (OUTPATIENT)
Dept: LAB | Age: 44
End: 2019-06-06
Attending: FAMILY MEDICINE
Payer: COMMERCIAL

## 2019-06-06 DIAGNOSIS — Z51.81 MEDICATION MONITORING ENCOUNTER: Primary | ICD-10-CM

## 2019-06-17 ENCOUNTER — LAB ENCOUNTER (OUTPATIENT)
Dept: LAB | Age: 44
End: 2019-06-17
Attending: FAMILY MEDICINE
Payer: COMMERCIAL

## 2019-06-17 DIAGNOSIS — D64.9 ANEMIA: Primary | ICD-10-CM

## 2019-06-17 PROCEDURE — 83540 ASSAY OF IRON: CPT

## 2019-06-17 PROCEDURE — 36415 COLL VENOUS BLD VENIPUNCTURE: CPT

## 2019-06-17 PROCEDURE — 84466 ASSAY OF TRANSFERRIN: CPT

## 2019-06-17 PROCEDURE — 82728 ASSAY OF FERRITIN: CPT

## 2019-06-17 PROCEDURE — 85025 COMPLETE CBC W/AUTO DIFF WBC: CPT

## 2019-08-01 ENCOUNTER — LAB ENCOUNTER (OUTPATIENT)
Dept: LAB | Age: 44
End: 2019-08-01
Attending: FAMILY MEDICINE
Payer: COMMERCIAL

## 2019-08-01 DIAGNOSIS — D64.9 ANEMIA, UNSPECIFIED: Primary | ICD-10-CM

## 2019-08-01 LAB
BASOPHILS # BLD AUTO: 0.07 X10(3) UL (ref 0–0.2)
BASOPHILS NFR BLD AUTO: 0.6 %
DEPRECATED HBV CORE AB SER IA-ACNC: 25.4 NG/ML (ref 12–240)
EOSINOPHIL # BLD AUTO: 0.06 X10(3) UL (ref 0–0.7)
EOSINOPHIL NFR BLD AUTO: 0.5 %
HCT VFR BLD AUTO: 40.8 % (ref 35–48)
HGB BLD-MCNC: 12.7 G/DL (ref 12–16)
IMM GRANULOCYTES # BLD AUTO: 0.04 X10(3) UL (ref 0–1)
IMM GRANULOCYTES NFR BLD: 0.3 %
IRON SATURATION: 19 % (ref 15–50)
IRON SERPL-MCNC: 71 UG/DL (ref 50–170)
LYMPHOCYTES # BLD AUTO: 2.64 X10(3) UL (ref 1–4)
LYMPHOCYTES NFR BLD AUTO: 22.9 %
MCH RBC QN AUTO: 26.2 PG (ref 26–34)
MCHC RBC AUTO-ENTMCNC: 31.1 G/DL (ref 31–37)
MCV RBC AUTO: 84.1 FL (ref 80–100)
MONOCYTES # BLD AUTO: 0.77 X10(3) UL (ref 0.1–1)
MONOCYTES NFR BLD AUTO: 6.7 %
NEUTROPHILS # BLD AUTO: 7.95 X10 (3) UL (ref 1.5–7.7)
NEUTROPHILS # BLD AUTO: 7.95 X10(3) UL (ref 1.5–7.7)
NEUTROPHILS NFR BLD AUTO: 69 %
PLATELET # BLD AUTO: 279 10(3)UL (ref 150–450)
PLATELET MORPHOLOGY: NORMAL
RBC # BLD AUTO: 4.85 X10(6)UL (ref 3.8–5.3)
TOTAL IRON BINDING CAPACITY: 371 UG/DL (ref 240–450)
TRANSFERRIN SERPL-MCNC: 249 MG/DL (ref 200–360)
WBC # BLD AUTO: 11.5 X10(3) UL (ref 4–11)

## 2019-08-01 PROCEDURE — 36415 COLL VENOUS BLD VENIPUNCTURE: CPT

## 2019-08-01 PROCEDURE — 84466 ASSAY OF TRANSFERRIN: CPT

## 2019-08-01 PROCEDURE — 85025 COMPLETE CBC W/AUTO DIFF WBC: CPT

## 2019-08-01 PROCEDURE — 83540 ASSAY OF IRON: CPT

## 2019-08-01 PROCEDURE — 82728 ASSAY OF FERRITIN: CPT

## 2019-08-23 ENCOUNTER — OFFICE VISIT (OUTPATIENT)
Dept: OTOLARYNGOLOGY | Facility: CLINIC | Age: 44
End: 2019-08-23
Payer: COMMERCIAL

## 2019-08-23 VITALS
DIASTOLIC BLOOD PRESSURE: 75 MMHG | TEMPERATURE: 98 F | HEIGHT: 65 IN | BODY MASS INDEX: 29.16 KG/M2 | SYSTOLIC BLOOD PRESSURE: 111 MMHG | WEIGHT: 175 LBS

## 2019-08-23 DIAGNOSIS — H60.391 OTHER INFECTIVE ACUTE OTITIS EXTERNA OF RIGHT EAR: Primary | ICD-10-CM

## 2019-08-23 PROCEDURE — 99214 OFFICE O/P EST MOD 30 MIN: CPT | Performed by: OTOLARYNGOLOGY

## 2019-08-23 PROCEDURE — 92504 EAR MICROSCOPY EXAMINATION: CPT | Performed by: OTOLARYNGOLOGY

## 2019-08-23 RX ORDER — CIPROFLOXACIN 500 MG/1
500 TABLET, FILM COATED ORAL EVERY 12 HOURS
Qty: 14 TABLET | Refills: 0 | Status: SHIPPED | OUTPATIENT
Start: 2019-08-23 | End: 2020-07-10 | Stop reason: ALTCHOICE

## 2019-08-23 RX ORDER — TOBRAMYCIN AND DEXAMETHASONE 3; 1 MG/ML; MG/ML
3 SUSPENSION/ DROPS OPHTHALMIC EVERY 8 HOURS
Qty: 1 BOTTLE | Refills: 0 | Status: SHIPPED | OUTPATIENT
Start: 2019-08-23 | End: 2020-09-04 | Stop reason: ALTCHOICE

## 2019-08-23 NOTE — PROGRESS NOTES
Hetal Daniel is a 40year old female. Patient presents with:  Ear Problem: possible right ear infection for about 1 week       HISTORY OF PRESENT ILLNESS  Long history of chronic sinus and ear issues.  She has had ear infections throughout her entire fluticasone.  Continue nasal congestion and postnasal drip with scratchiness and irritation to her throat.  She is throat clearing.  Occasional cough with some voice changes.  Allergies?  I suspect yes.  Previously noted to have a deviation of her septum t Yes        Alcohol/week: 0.0 standard drinks        Comment: RARE      Drug use: No      Family History   Problem Relation Age of Onset   • Heart Disorder Father    • Diabetes Father    • Other (Other) Mother    • Cancer Maternal Grandmother    • Diabetes Negative Tremors. Psych Negative Anxiety and depression. Integumentary Negative Frequent skin infections, pigment change and rash. Hema/Lymph Negative Easy bleeding and easy bruising.            PHYSICAL EXAM    /75   Temp 97.9 °F (36.6 °C) (Ora tube.    Current Outpatient Medications:   •  Ciprofloxacin HCl 500 MG Oral Tab, Take 1 tablet (500 mg total) by mouth every 12 (twelve) hours. , Disp: 14 tablet, Rfl: 0  •  tobramycin-dexamethasone 0.3-0.1 % Ophthalmic Suspension, Place 3 drops in ear(s) e Start TobraDex eardrops as well as ciprofloxacin for 7 days. Return to see me in 2 weeks with strict water precautions. Tubes in place and patent bilaterally            Glenn Pemberton MD    8/23/2019    11:08 AM

## 2019-09-06 ENCOUNTER — OFFICE VISIT (OUTPATIENT)
Dept: OTOLARYNGOLOGY | Facility: CLINIC | Age: 44
End: 2019-09-06
Payer: COMMERCIAL

## 2019-09-06 VITALS
SYSTOLIC BLOOD PRESSURE: 123 MMHG | HEIGHT: 65 IN | WEIGHT: 175 LBS | TEMPERATURE: 99 F | BODY MASS INDEX: 29.16 KG/M2 | DIASTOLIC BLOOD PRESSURE: 83 MMHG

## 2019-09-06 DIAGNOSIS — H60.391 OTHER INFECTIVE ACUTE OTITIS EXTERNA OF RIGHT EAR: ICD-10-CM

## 2019-09-06 DIAGNOSIS — J34.2 DEVIATED NASAL SEPTUM: Primary | ICD-10-CM

## 2019-09-06 PROCEDURE — 99214 OFFICE O/P EST MOD 30 MIN: CPT | Performed by: OTOLARYNGOLOGY

## 2019-09-06 NOTE — PROGRESS NOTES
Latosha Thomason is a 40year old female.   Patient presents with:  Ear Problem: pt here for a 1wk follow up on  infective acute otitis externa of right ear: completed antibiotics, pt states still feels like in a tunnel       HISTORY OF PRESENT ILLNESS  L chronic.     4/27/18 last visit I started her on Singulair and Claritin and she has been using fluticasone.  Continue nasal congestion and postnasal drip with scratchiness and irritation to her throat.  She is throat clearing.  Occasional cough with some v possible septal surgery in the near future. She will be having orthopedic arthroscopic surgery in the near future as well.       Social History    Socioeconomic History      Marital status: Single      Spouse name: Not on file      Number of children: Not and left ovarian cystectomy   • T&A     • TONSILLECTOMY           REVIEW OF SYSTEMS    System Neg/Pos Details   Constitutional Negative Fatigue, fever and weight loss. ENMT Negative Drooling. Eyes Negative Blurred vision and vision changes.    Respirato External nose - Normal. Lips/teeth/gums - Normal. Tonsils - Normal. Oropharynx - Normal.   Nose/Mouth/Throat Normal Nares - Right: Normal Left: Normal. Septum -Normal  Turbinates - Right: Normal, Left: Normal.       Current Outpatient Medications:   •  HYD infective acute otitis externa of right ear  She will be having some arthroscopic hip surgery in the near future. I have recommended that she address her nasal issues after her orthopedics surgery.   She will require her therapist to be in the operating ro

## 2019-09-20 ENCOUNTER — LAB ENCOUNTER (OUTPATIENT)
Dept: LAB | Age: 44
End: 2019-09-20
Attending: ORTHOPAEDIC SURGERY
Payer: COMMERCIAL

## 2019-09-20 DIAGNOSIS — M25.859 OTHER SPECIFIED JOINT DISORDERS, UNSPECIFIED HIP: Primary | ICD-10-CM

## 2019-09-20 LAB
ALBUMIN SERPL-MCNC: 3.7 G/DL (ref 3.4–5)
ALBUMIN/GLOB SERPL: 1.3 {RATIO} (ref 1–2)
ALP LIVER SERPL-CCNC: 70 U/L (ref 37–98)
ALT SERPL-CCNC: 16 U/L (ref 13–56)
ANION GAP SERPL CALC-SCNC: 5 MMOL/L (ref 0–18)
AST SERPL-CCNC: 16 U/L (ref 15–37)
BASOPHILS # BLD AUTO: 0.05 X10(3) UL (ref 0–0.2)
BASOPHILS NFR BLD AUTO: 0.7 %
BILIRUB SERPL-MCNC: 0.3 MG/DL (ref 0.1–2)
BUN BLD-MCNC: 8 MG/DL (ref 7–18)
BUN/CREAT SERPL: 11.1 (ref 10–20)
CALCIUM BLD-MCNC: 8.6 MG/DL (ref 8.5–10.1)
CHLORIDE SERPL-SCNC: 109 MMOL/L (ref 98–112)
CO2 SERPL-SCNC: 27 MMOL/L (ref 21–32)
CREAT BLD-MCNC: 0.72 MG/DL (ref 0.55–1.02)
DEPRECATED RDW RBC AUTO: 42.4 FL (ref 35.1–46.3)
EOSINOPHIL # BLD AUTO: 0.11 X10(3) UL (ref 0–0.7)
EOSINOPHIL NFR BLD AUTO: 1.6 %
ERYTHROCYTE [DISTWIDTH] IN BLOOD BY AUTOMATED COUNT: 14.1 % (ref 11–15)
GLOBULIN PLAS-MCNC: 2.9 G/DL (ref 2.8–4.4)
GLUCOSE BLD-MCNC: 77 MG/DL (ref 70–99)
HCT VFR BLD AUTO: 40.2 % (ref 35–48)
HGB BLD-MCNC: 12.9 G/DL (ref 12–16)
IMM GRANULOCYTES # BLD AUTO: 0.01 X10(3) UL (ref 0–1)
IMM GRANULOCYTES NFR BLD: 0.1 %
INR BLD: 1.14 (ref 0.9–1.2)
LYMPHOCYTES # BLD AUTO: 2.23 X10(3) UL (ref 1–4)
LYMPHOCYTES NFR BLD AUTO: 31.7 %
M PROTEIN MFR SERPL ELPH: 6.6 G/DL (ref 6.4–8.2)
MCH RBC QN AUTO: 28.6 PG (ref 26–34)
MCHC RBC AUTO-ENTMCNC: 32.1 G/DL (ref 31–37)
MCV RBC AUTO: 89.1 FL (ref 80–100)
MONOCYTES # BLD AUTO: 0.73 X10(3) UL (ref 0.1–1)
MONOCYTES NFR BLD AUTO: 10.4 %
NEUTROPHILS # BLD AUTO: 3.91 X10 (3) UL (ref 1.5–7.7)
NEUTROPHILS # BLD AUTO: 3.91 X10(3) UL (ref 1.5–7.7)
NEUTROPHILS NFR BLD AUTO: 55.5 %
OSMOLALITY SERPL CALC.SUM OF ELEC: 289 MOSM/KG (ref 275–295)
PATIENT FASTING: YES
PLATELET # BLD AUTO: 280 10(3)UL (ref 150–450)
POTASSIUM SERPL-SCNC: 4.1 MMOL/L (ref 3.5–5.1)
PROTHROMBIN TIME: 14.5 SECONDS (ref 11.8–14.5)
RBC # BLD AUTO: 4.51 X10(6)UL (ref 3.8–5.3)
SODIUM SERPL-SCNC: 141 MMOL/L (ref 136–145)
WBC # BLD AUTO: 7 X10(3) UL (ref 4–11)

## 2019-09-20 PROCEDURE — 85025 COMPLETE CBC W/AUTO DIFF WBC: CPT

## 2019-09-20 PROCEDURE — 36415 COLL VENOUS BLD VENIPUNCTURE: CPT

## 2019-09-20 PROCEDURE — 80053 COMPREHEN METABOLIC PANEL: CPT

## 2019-09-20 PROCEDURE — 85610 PROTHROMBIN TIME: CPT

## 2019-12-23 ENCOUNTER — APPOINTMENT (OUTPATIENT)
Dept: GENERAL RADIOLOGY | Age: 44
End: 2019-12-23
Attending: EMERGENCY MEDICINE
Payer: COMMERCIAL

## 2019-12-23 ENCOUNTER — HOSPITAL ENCOUNTER (OUTPATIENT)
Age: 44
Discharge: HOME | End: 2019-12-23
Attending: EMERGENCY MEDICINE
Payer: COMMERCIAL

## 2019-12-23 VITALS
SYSTOLIC BLOOD PRESSURE: 107 MMHG | HEART RATE: 95 BPM | DIASTOLIC BLOOD PRESSURE: 95 MMHG | TEMPERATURE: 98 F | RESPIRATION RATE: 20 BRPM | OXYGEN SATURATION: 100 %

## 2019-12-23 DIAGNOSIS — S59.902A ELBOW INJURY, LEFT, INITIAL ENCOUNTER: Primary | ICD-10-CM

## 2019-12-23 PROCEDURE — 99213 OFFICE O/P EST LOW 20 MIN: CPT

## 2019-12-23 PROCEDURE — 73080 X-RAY EXAM OF ELBOW: CPT | Performed by: EMERGENCY MEDICINE
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
## 2020-02-25 ENCOUNTER — TELEPHONE (OUTPATIENT)
Dept: OBGYN CLINIC | Facility: CLINIC | Age: 45
End: 2020-02-25

## 2020-02-25 DIAGNOSIS — R92.8 ABNORMAL MAMMOGRAM OF LEFT BREAST: Primary | ICD-10-CM

## 2020-02-25 NOTE — TELEPHONE ENCOUNTER
Per in office tickler file, pt is due for 6 month repeat left diagnostic mammogram and usn. No recent mammogram results found in [facility] portal.  Orders for DX mammogram and ultrasound done in [facility] and mailed to pt. Lm on pt's voicemail to call back.

## 2020-07-10 ENCOUNTER — OFFICE VISIT (OUTPATIENT)
Dept: OTOLARYNGOLOGY | Facility: CLINIC | Age: 45
End: 2020-07-10
Payer: COMMERCIAL

## 2020-07-10 VITALS
DIASTOLIC BLOOD PRESSURE: 91 MMHG | SYSTOLIC BLOOD PRESSURE: 134 MMHG | WEIGHT: 185 LBS | HEIGHT: 65 IN | BODY MASS INDEX: 30.82 KG/M2

## 2020-07-10 DIAGNOSIS — H92.03 EAR PAIN, BILATERAL: Primary | ICD-10-CM

## 2020-07-10 PROCEDURE — 99213 OFFICE O/P EST LOW 20 MIN: CPT | Performed by: OTOLARYNGOLOGY

## 2020-07-10 RX ORDER — BETAMETHASONE DIPROPIONATE 0.05 %
OINTMENT (GRAM) TOPICAL
COMMUNITY
Start: 2020-06-10 | End: 2020-07-10

## 2020-07-10 RX ORDER — PREGABALIN 100 MG/1
100 CAPSULE ORAL 2 TIMES DAILY
COMMUNITY
Start: 2020-06-18 | End: 2020-09-04

## 2020-07-10 RX ORDER — FLUTICASONE PROPIONATE AND SALMETEROL 250; 50 UG/1; UG/1
1 POWDER RESPIRATORY (INHALATION) EVERY 12 HOURS
COMMUNITY
End: 2020-09-04

## 2020-07-10 RX ORDER — METHOCARBAMOL 750 MG/1
750 TABLET, FILM COATED ORAL
COMMUNITY
End: 2020-09-04

## 2020-07-10 RX ORDER — CELECOXIB 200 MG/1
200 CAPSULE ORAL DAILY PRN
Qty: 30 CAPSULE | Refills: 0 | Status: SHIPPED | OUTPATIENT
Start: 2020-07-10 | End: 2020-08-11

## 2020-07-10 NOTE — PROGRESS NOTES
Christelle Bernardo is a 39year old female. Patient presents with:  Ear Problem: possible fluid in the right ear for about 1 week, sharp pain in the left ear for about 2 days      HISTORY OF PRESENT ILLNESS  Long history of chronic sinus and ear issues.  Alexia Tanner Singulair and Claritin and she has been using fluticasone.  Continue nasal congestion and postnasal drip with scratchiness and irritation to her throat.  She is throat clearing.  Occasional cough with some voice changes.  Allergies?  I suspect yes.  Previo be having orthopedic arthroscopic surgery in the near future as well.     7/10/2020 doing well up until recently she started having some ear pain right greater than left. This occurred about a month ago.   She feels that there is drainage from her right ea without hypertension        Past Surgical History:   Procedure Laterality Date   • ADENOIDECTOMY     • ANESTH,SURGERY OF SHOULDER Right    • CARPAL TUNNEL RELEASE     • CORTISONE INJECTION      x2 days a week   • HAND/FINGER SURGERY UNLISTED Right 2/2015 affect.    Neck Exam Normal Inspection - Normal. Palpation - Normal. Parotid gland - Normal. Thyroid gland - Normal.   Eyes Normal Conjunctiva - Right: Normal, Left: Normal. Pupil - Right: Normal, Left: Normal. Fundus - Right: Normal, Left: Normal.   Neurol 1 T PO QID PRN, Disp: , Rfl: 5  •  TraZODone HCl 100 MG Oral Tab, TK 1 T PO QPM, Disp: , Rfl: 5  •  FLOVENT DISKUS 250 MCG/BLIST Inhalation Aerosol Powder, Breath Activated, INL 1 PUFF PO BID UTD, Disp: , Rfl: 5  •  Diclofenac Sodium 1 % Transdermal Gel, A

## 2020-08-04 ENCOUNTER — TELEPHONE (OUTPATIENT)
Dept: PHYSICAL THERAPY | Age: 45
End: 2020-08-04

## 2020-08-05 ENCOUNTER — TELEPHONE (OUTPATIENT)
Dept: PHYSICAL THERAPY | Age: 45
End: 2020-08-05

## 2020-08-06 ENCOUNTER — OFFICE VISIT (OUTPATIENT)
Dept: PHYSICAL THERAPY | Age: 45
End: 2020-08-06
Attending: OTOLARYNGOLOGY
Payer: COMMERCIAL

## 2020-08-06 DIAGNOSIS — H92.03 EAR PAIN, BILATERAL: ICD-10-CM

## 2020-08-06 PROCEDURE — 97163 PT EVAL HIGH COMPLEX 45 MIN: CPT

## 2020-08-06 PROCEDURE — 97530 THERAPEUTIC ACTIVITIES: CPT

## 2020-08-06 NOTE — PROGRESS NOTES
TMJ EVALUATION:   Referring Physician: Dr. Blanka Estrada  Diagnosis:  Ear pain, bilateral (H92.03)     Date of Onset: 7/20/20 Date of Service: 8/6/2020     PATIENT Ravi Shields is a 39year old y/o female who presents to therapy today with comp Date   • Anxiety state    • Arthritis    • Asthma    • Calculus of kidney    • Fibroids    • Osteoarthritis    • Ovarian cyst    • Scoliosis    • Visual impairment     WEARS GLASSES   • White coat syndrome with high blood pressure without hypertension depression,  bilateral lateral deviation Maru Hernandez has R side deviation,  with C with jaw depression. Negative for clicking  for joint sounds . Kayla Bellis is negative for joint loading.  Masticatory muscles and  paracervical muscles a restricted   Hyoids minimally restricted minimally restricted   [place] severely restricted minimally restricted   Temporalis moderately restricted WNL   Lateral Pole severely restricted minimally restricted   Lateral pterygoid NT NT            PLAN OF CA may be changed as appropriate. Treatment will include: Manual Therapy; Therapeutic Exercises; Neuromuscular Re-education;  Therapeutic Activity; ;Modalities as needed, Patient education; Home exercise program instruction    Education or treatment limitat instruments,cheering,laughing,amateur sports/hobbies) 1    0-I am enjoying a N social life and or recreational activities without restrictions  1-I participate in N social life and/or recreational activities but pain/discomfort is increased  2- The presenc Sleep (restful, nocturnal sleep pattern):2  0- I sleep well in N fashion w/o pain meds,relaxants of medicinal sleeping aides  1- I sleep well with the use of pain pills,anti-inflammatory meds or sleeping aides  2-I fail to realize 6 hrs of restful sleep ev answers:____%    Patient/Family/Caregiver PT was advised of these findings, precautions, and treatment options and has agreed to actively participate in planning and for this course of care.     Thank you for your referral. Please co-sign or sign and return

## 2020-08-11 RX ORDER — CELECOXIB 200 MG/1
CAPSULE ORAL
Qty: 30 CAPSULE | Refills: 0 | Status: SHIPPED | OUTPATIENT
Start: 2020-08-11 | End: 2020-09-04

## 2020-08-24 ENCOUNTER — OFFICE VISIT (OUTPATIENT)
Dept: PHYSICAL THERAPY | Age: 45
End: 2020-08-24
Attending: OTOLARYNGOLOGY
Payer: COMMERCIAL

## 2020-08-24 DIAGNOSIS — H92.03 EAR PAIN, BILATERAL: ICD-10-CM

## 2020-08-24 PROCEDURE — 97140 MANUAL THERAPY 1/> REGIONS: CPT

## 2020-08-24 PROCEDURE — 97110 THERAPEUTIC EXERCISES: CPT

## 2020-08-24 NOTE — PROGRESS NOTES
Dx: Ear pain, bilateral        Insurance   PPO        POC# of visits: 10          Authorized  # visits by insurance  :10   Expiration date :NA  Authorizing Physician: Dr. Kalie Rosales MD visit: None scheduled  Fall Risk: standard         Precautions: none % of the time for improved symmetry on TMJ rotation and translation     Plan: cont per POC  Date: 8/24/2020  TX#: 2/10 Date:               TX#: 3/ Date:              TX#: 4/ Date:               TX#: 5/ Date:    Tx#: 6/   Theraex: UBE level 0 x6 mins:

## 2020-08-27 ENCOUNTER — OFFICE VISIT (OUTPATIENT)
Dept: PHYSICAL THERAPY | Age: 45
End: 2020-08-27
Attending: OTOLARYNGOLOGY
Payer: COMMERCIAL

## 2020-08-27 DIAGNOSIS — H92.03 EAR PAIN, BILATERAL: ICD-10-CM

## 2020-08-27 PROCEDURE — 97110 THERAPEUTIC EXERCISES: CPT

## 2020-08-27 PROCEDURE — 97140 MANUAL THERAPY 1/> REGIONS: CPT

## 2020-08-27 NOTE — PROGRESS NOTES
Dx: Ear pain, bilateral        Insurance   PPO        POC# of visits: 10          Authorized  # visits by insurance  :10   Expiration date :NA  Authorizing Physician: Dr. Josie Mcnamara  Next MD visit: None scheduled  Fall Risk: standard         Precautions: none Tx#: 6/   Theraex: UBE level 0 x6 mins: 3 forward and 3 backwards  Seated jaw depression level 3-4 for TMJ rotation/translation exercises  Seated lateral flexion 2x10 with MFR  UT stretches 30 x3 R/L  Supine jaw depression x5  Supine L rotation  Scapula

## 2020-08-31 ENCOUNTER — OFFICE VISIT (OUTPATIENT)
Dept: PHYSICAL THERAPY | Age: 45
End: 2020-08-31
Attending: OTOLARYNGOLOGY
Payer: COMMERCIAL

## 2020-08-31 PROCEDURE — 97110 THERAPEUTIC EXERCISES: CPT

## 2020-08-31 PROCEDURE — 97140 MANUAL THERAPY 1/> REGIONS: CPT

## 2020-08-31 NOTE — PROGRESS NOTES
Dx: Ear pain, bilateral        Insurance   PPO        POC# of visits: 10          Authorized  # visits by insurance  :10   Expiration date :NA  Authorizing Physician: Dr. Holly Rosales MD visit: None scheduled  Fall Risk: standard         Precautions: none 5/ Date:    Tx#: 6/   Theraex: UBE level 0 x6 mins: 3 forward and 3 backwards  Seated jaw depression level 3-4 for TMJ rotation/translation exercises  Seated lateral flexion 2x10 with MFR  UT stretches 30 x3 R/L  Supine jaw depression x5  Supine L rotation

## 2020-09-03 ENCOUNTER — OFFICE VISIT (OUTPATIENT)
Dept: PHYSICAL THERAPY | Age: 45
End: 2020-09-03
Attending: OTOLARYNGOLOGY
Payer: COMMERCIAL

## 2020-09-03 PROCEDURE — 97140 MANUAL THERAPY 1/> REGIONS: CPT

## 2020-09-03 PROCEDURE — 97110 THERAPEUTIC EXERCISES: CPT

## 2020-09-03 NOTE — PROGRESS NOTES
Dx: Ear pain, bilateral        Insurance   PPO        POC# of visits: 10          Authorized  # visits by insurance  :10   Expiration date :NA  Authorizing Physician: Dr. Ellis Form  Next MD visit: None scheduled  Fall Risk: standard         Precautions: none side deviation % of the time for improved symmetry on TMJ rotation and translation     Plan: cont per POC  Date: 8/24/2020  TX#: 2/10 Date:            8/27/20   TX#: 3/10 Date:    8/31/20          TX#: 4/10 Date:            9/3/20   TX#: 5/10 Date: contralateral rotation R/L sidex 10 each Manual:   Gait/NMRed: Gait/NMRed: Gait/NMRed: Gait/NMRed: Gait/NMRed:   Others: Others: Others: Others:  Others:     HEP: ( see pt instructions )   8/24/20: UT stretches, scapula retraction:narrow/extension  8/27/20:

## 2020-09-04 ENCOUNTER — OFFICE VISIT (OUTPATIENT)
Dept: FAMILY MEDICINE CLINIC | Facility: CLINIC | Age: 45
End: 2020-09-04
Payer: COMMERCIAL

## 2020-09-04 VITALS
BODY MASS INDEX: 30.85 KG/M2 | DIASTOLIC BLOOD PRESSURE: 84 MMHG | HEIGHT: 65 IN | WEIGHT: 185.19 LBS | HEART RATE: 105 BPM | TEMPERATURE: 98 F | RESPIRATION RATE: 16 BRPM | SYSTOLIC BLOOD PRESSURE: 132 MMHG

## 2020-09-04 DIAGNOSIS — J45.30 MILD PERSISTENT ASTHMA, UNSPECIFIED WHETHER COMPLICATED: ICD-10-CM

## 2020-09-04 DIAGNOSIS — F43.10 PTSD (POST-TRAUMATIC STRESS DISORDER): ICD-10-CM

## 2020-09-04 DIAGNOSIS — M54.50 CHRONIC BILATERAL LOW BACK PAIN, UNSPECIFIED WHETHER SCIATICA PRESENT: Primary | ICD-10-CM

## 2020-09-04 DIAGNOSIS — M15.3 POST-TRAUMATIC OSTEOARTHRITIS OF MULTIPLE JOINTS: ICD-10-CM

## 2020-09-04 DIAGNOSIS — G89.29 CHRONIC BILATERAL LOW BACK PAIN, UNSPECIFIED WHETHER SCIATICA PRESENT: Primary | ICD-10-CM

## 2020-09-04 PROBLEM — L76.34 POSTPROCEDURAL SEROMA OF SKIN AND SUBCUTANEOUS TISSUE FOLLOWING OTHER PROCEDURE: Status: RESOLVED | Noted: 2017-05-02 | Resolved: 2020-09-04

## 2020-09-04 PROCEDURE — 3075F SYST BP GE 130 - 139MM HG: CPT | Performed by: FAMILY MEDICINE

## 2020-09-04 PROCEDURE — 3079F DIAST BP 80-89 MM HG: CPT | Performed by: FAMILY MEDICINE

## 2020-09-04 PROCEDURE — 99204 OFFICE O/P NEW MOD 45 MIN: CPT | Performed by: FAMILY MEDICINE

## 2020-09-04 PROCEDURE — 3008F BODY MASS INDEX DOCD: CPT | Performed by: FAMILY MEDICINE

## 2020-09-04 RX ORDER — ALPRAZOLAM 1 MG/1
1 TABLET ORAL 3 TIMES DAILY PRN
Qty: 90 TABLET | Refills: 2 | Status: SHIPPED | OUTPATIENT
Start: 2020-09-04 | End: 2020-12-03

## 2020-09-04 RX ORDER — HYDROCODONE BITARTRATE AND ACETAMINOPHEN 5; 325 MG/1; MG/1
1 TABLET ORAL EVERY 6 HOURS PRN
Qty: 120 TABLET | Refills: 0 | Status: SHIPPED | OUTPATIENT
Start: 2020-11-04 | End: 2020-12-04

## 2020-09-04 RX ORDER — PREGABALIN 100 MG/1
100 CAPSULE ORAL 2 TIMES DAILY
Qty: 60 CAPSULE | Refills: 5 | Status: SHIPPED | OUTPATIENT
Start: 2020-09-04

## 2020-09-04 RX ORDER — TRAZODONE HYDROCHLORIDE 100 MG/1
100 TABLET ORAL NIGHTLY
Qty: 120 TABLET | Refills: 5 | Status: SHIPPED | OUTPATIENT
Start: 2020-09-04 | End: 2020-09-08

## 2020-09-04 RX ORDER — FLUTICASONE PROPIONATE AND SALMETEROL 250; 50 UG/1; UG/1
1 POWDER RESPIRATORY (INHALATION) EVERY 12 HOURS
Qty: 1 EACH | Refills: 5 | Status: SHIPPED | OUTPATIENT
Start: 2020-09-04

## 2020-09-04 RX ORDER — METHOCARBAMOL 750 MG/1
750 TABLET, FILM COATED ORAL DAILY
Qty: 30 TABLET | Refills: 5 | COMMUNITY
Start: 2020-09-04

## 2020-09-04 RX ORDER — HYDROCODONE BITARTRATE AND ACETAMINOPHEN 5; 325 MG/1; MG/1
1 TABLET ORAL EVERY 6 HOURS PRN
Qty: 120 TABLET | Refills: 0 | Status: SHIPPED | OUTPATIENT
Start: 2020-09-04 | End: 2020-09-04

## 2020-09-04 RX ORDER — HYDROCODONE BITARTRATE AND ACETAMINOPHEN 5; 325 MG/1; MG/1
1 TABLET ORAL EVERY 6 HOURS PRN
Qty: 120 TABLET | Refills: 0 | Status: SHIPPED | OUTPATIENT
Start: 2020-10-04 | End: 2020-09-04

## 2020-09-04 RX ORDER — FLUTICASONE PROPIONATE 250 UG/1
1 POWDER, METERED RESPIRATORY (INHALATION) 2 TIMES DAILY
Qty: 60 EACH | Refills: 5 | Status: SHIPPED | OUTPATIENT
Start: 2020-09-04

## 2020-09-04 RX ORDER — TRAMADOL HYDROCHLORIDE 50 MG/1
50 TABLET ORAL EVERY 6 HOURS PRN
Qty: 120 TABLET | Refills: 2 | Status: SHIPPED | OUTPATIENT
Start: 2020-09-04 | End: 2020-12-10

## 2020-09-04 RX ORDER — MONTELUKAST SODIUM 10 MG/1
10 TABLET ORAL NIGHTLY
Qty: 30 TABLET | Refills: 5 | Status: SHIPPED | OUTPATIENT
Start: 2020-09-04

## 2020-09-04 NOTE — PROGRESS NOTES
HPI:    Patient ID: Renetta Panda is a 39year old female. Pain   This is a chronic problem. The current episode started more than 1 year ago. The problem occurs daily. The problem has been unchanged.  Associated symptoms include arthralgias, joint Dipropionate Aug 0.05 % External Ointment Apply by topical route every day to the affected area(s); do not exceed 45 grams per week.  1 Tube 0   • Diclofenac Sodium 1 % Transdermal Gel KYRIE UTD AA TID  3   • baclofen 10 MG Oral Tab TK 1 T PO  Q 8 H PRN  0 followed by orthopedics as above. Status post bilateral knee surgeries. Ptsd (post-traumatic stress disorder)– therapy with forward will be as above. Continuing on alprazolam 3 times daily to 4 times daily and trazodone at at bedtime.   Severe trust is

## 2020-09-08 ENCOUNTER — TELEPHONE (OUTPATIENT)
Dept: FAMILY MEDICINE CLINIC | Facility: CLINIC | Age: 45
End: 2020-09-08

## 2020-09-08 RX ORDER — TRAZODONE HYDROCHLORIDE 100 MG/1
200 TABLET ORAL NIGHTLY
Qty: 180 TABLET | Refills: 1 | Status: SHIPPED | OUTPATIENT
Start: 2020-09-08

## 2020-09-08 NOTE — TELEPHONE ENCOUNTER
Spoke to patient to let her know trazodone 100 mg 2 tabs nightly was sent to the pharmacy as requested

## 2020-09-08 NOTE — TELEPHONE ENCOUNTER
Patient called and advised that she gave the wrong dose to the Dr when she was there on Friday 9/4. She advised she usually takes 2 pills nightly (so she would be taking 200 MG nightly). She is asking for this to be corrected for the dose and the QTY.

## 2020-09-09 ENCOUNTER — TELEPHONE (OUTPATIENT)
Dept: PHYSICAL THERAPY | Age: 45
End: 2020-09-09

## 2020-09-11 ENCOUNTER — APPOINTMENT (OUTPATIENT)
Dept: PHYSICAL THERAPY | Age: 45
End: 2020-09-11
Attending: OTOLARYNGOLOGY
Payer: COMMERCIAL

## 2020-09-15 ENCOUNTER — APPOINTMENT (OUTPATIENT)
Dept: PHYSICAL THERAPY | Age: 45
End: 2020-09-15
Attending: OTOLARYNGOLOGY
Payer: COMMERCIAL

## 2020-09-18 ENCOUNTER — APPOINTMENT (OUTPATIENT)
Dept: PHYSICAL THERAPY | Age: 45
End: 2020-09-18
Attending: OTOLARYNGOLOGY
Payer: COMMERCIAL

## 2020-09-18 ENCOUNTER — OFFICE VISIT (OUTPATIENT)
Dept: OTOLARYNGOLOGY | Facility: CLINIC | Age: 45
End: 2020-09-18
Payer: COMMERCIAL

## 2020-09-18 VITALS
SYSTOLIC BLOOD PRESSURE: 102 MMHG | DIASTOLIC BLOOD PRESSURE: 70 MMHG | WEIGHT: 185 LBS | TEMPERATURE: 98 F | BODY MASS INDEX: 30.82 KG/M2 | HEIGHT: 65 IN

## 2020-09-18 DIAGNOSIS — S03.00XA DISLOCATION OF TEMPOROMANDIBULAR JOINT, INITIAL ENCOUNTER: Primary | ICD-10-CM

## 2020-09-18 PROCEDURE — 3078F DIAST BP <80 MM HG: CPT | Performed by: OTOLARYNGOLOGY

## 2020-09-18 PROCEDURE — 3008F BODY MASS INDEX DOCD: CPT | Performed by: OTOLARYNGOLOGY

## 2020-09-18 PROCEDURE — 99213 OFFICE O/P EST LOW 20 MIN: CPT | Performed by: OTOLARYNGOLOGY

## 2020-09-18 PROCEDURE — 3074F SYST BP LT 130 MM HG: CPT | Performed by: OTOLARYNGOLOGY

## 2020-09-18 NOTE — PROGRESS NOTES
Bianka Masterson is a 39year old female. Patient presents with: Follow - Up: 2 month follow up- bilateral ear pain-per  pt there is some changes/improvement of symptoms      HISTORY OF PRESENT ILLNESS  Long history of chronic sinus and ear issues.  She Singulair and Claritin and she has been using fluticasone.  Continue nasal congestion and postnasal drip with scratchiness and irritation to her throat.  She is throat clearing.  Occasional cough with some voice changes.  Allergies?  I suspect yes.  Previo be having orthopedic arthroscopic surgery in the near future as well.     7/10/2020 doing well up until recently she started having some ear pain right greater than left. This occurred about a month ago.   She feels that there is drainage from her right ea Sexual Activity      Alcohol use:  Yes        Alcohol/week: 0.0 standard drinks        Comment: RARE      Drug use: No      Family History   Problem Relation Age of Onset   • Heart Disorder Father    • Diabetes Father    • Other (Other) Mother    • Cancer M Negative Abdominal pain and diarrhea. Endocrine Negative Cold intolerance and heat intolerance. Neuro Negative Tremors. Psych Negative Anxiety and depression. Integumentary Negative Frequent skin infections, pigment change and rash.    Hema/Lymph Ne 2  •  ALPRAZolam 1 MG Oral Tab, Take 1 tablet (1 mg total) by mouth 3 (three) times daily as needed. , Disp: 90 tablet, Rfl: 2  •  fluticasone-salmeterol 250-50 MCG/DOSE Inhalation Aerosol Powder, Breath Activated, Inhale 1 puff into the lungs Q12H., Disp: less expensive rate. She will return to see me in a few months.   We did discuss her septum once again and she thinks she might address this next year over the summer.  - PHYSICAL THERAPY EXTERNAL        This note was prepared using [employer] re

## 2020-09-25 ENCOUNTER — APPOINTMENT (OUTPATIENT)
Dept: PHYSICAL THERAPY | Age: 45
End: 2020-09-25
Attending: OTOLARYNGOLOGY
Payer: COMMERCIAL

## 2020-10-01 ENCOUNTER — HOSPITAL ENCOUNTER (OUTPATIENT)
Dept: GENERAL RADIOLOGY | Age: 45
Discharge: HOME | End: 2020-10-01
Attending: PAIN MEDICINE
Payer: COMMERCIAL

## 2020-10-01 DIAGNOSIS — R52 PAIN: ICD-10-CM

## 2020-10-01 PROCEDURE — 72110 X-RAY EXAM L-2 SPINE 4/>VWS: CPT | Performed by: PAIN MEDICINE

## 2020-10-02 ENCOUNTER — APPOINTMENT (OUTPATIENT)
Dept: PHYSICAL THERAPY | Age: 45
End: 2020-10-02
Attending: OTOLARYNGOLOGY
Payer: COMMERCIAL

## 2020-10-19 ENCOUNTER — LAB REQUISITION (OUTPATIENT)
Dept: LAB | Facility: HOSPITAL | Age: 45
End: 2020-10-19
Payer: COMMERCIAL

## 2020-10-19 DIAGNOSIS — Z13.220 ENCOUNTER FOR SCREENING FOR LIPOID DISORDERS: ICD-10-CM

## 2020-10-19 DIAGNOSIS — N20.0 CALCULUS OF KIDNEY: ICD-10-CM

## 2020-10-19 PROCEDURE — 80053 COMPREHEN METABOLIC PANEL: CPT | Performed by: FAMILY MEDICINE

## 2020-10-19 PROCEDURE — 80061 LIPID PANEL: CPT | Performed by: FAMILY MEDICINE

## 2020-12-10 RX ORDER — TRAMADOL HYDROCHLORIDE 50 MG/1
50 TABLET ORAL EVERY 6 HOURS PRN
Qty: 120 TABLET | Refills: 2 | Status: SHIPPED | OUTPATIENT
Start: 2020-12-10

## 2020-12-10 NOTE — TELEPHONE ENCOUNTER
•  traMADol HCl 50 MG Oral Tab, Take 1 tablet (50 mg total) by mouth every 6 (six) hours as needed for Pain., Disp: 120 tablet, Rfl: 2

## 2021-01-04 ENCOUNTER — OFFICE VISIT (OUTPATIENT)
Dept: OTOLARYNGOLOGY | Facility: CLINIC | Age: 46
End: 2021-01-04
Payer: COMMERCIAL

## 2021-01-04 VITALS
SYSTOLIC BLOOD PRESSURE: 114 MMHG | WEIGHT: 185 LBS | TEMPERATURE: 98 F | HEIGHT: 65 IN | DIASTOLIC BLOOD PRESSURE: 80 MMHG | BODY MASS INDEX: 30.82 KG/M2

## 2021-01-04 DIAGNOSIS — H60.391 OTHER INFECTIVE ACUTE OTITIS EXTERNA OF RIGHT EAR: ICD-10-CM

## 2021-01-04 DIAGNOSIS — S03.00XA DISLOCATION OF TEMPOROMANDIBULAR JOINT, INITIAL ENCOUNTER: Primary | ICD-10-CM

## 2021-01-04 DIAGNOSIS — J34.2 DEVIATED NASAL SEPTUM: ICD-10-CM

## 2021-01-04 PROCEDURE — 99214 OFFICE O/P EST MOD 30 MIN: CPT | Performed by: OTOLARYNGOLOGY

## 2021-01-04 PROCEDURE — 3074F SYST BP LT 130 MM HG: CPT | Performed by: OTOLARYNGOLOGY

## 2021-01-04 PROCEDURE — 3008F BODY MASS INDEX DOCD: CPT | Performed by: OTOLARYNGOLOGY

## 2021-01-04 PROCEDURE — 3079F DIAST BP 80-89 MM HG: CPT | Performed by: OTOLARYNGOLOGY

## 2021-01-04 NOTE — PROGRESS NOTES
Hetal Daniel is a 39year old female. Patient presents with:   Follow - Up: Patient here for follow up regarding Dislocation of Temporomandibular joint, per pt has concern of physical therapy, wearing a bite guard       HISTORY OF PRESENT ILLNESS  Lo chronic.     4/27/18 last visit I started her on Singulair and Claritin and she has been using fluticasone.  Continue nasal congestion and postnasal drip with scratchiness and irritation to her throat.  She is throat clearing.  Occasional cough with some v possible septal surgery in the near future.  She will be having orthopedic arthroscopic surgery in the near future as well.     7/10/2020 doing well up until recently she started having some ear pain right greater than left.  This occurred about a month ag she actually has pain within a matter of hours. Also feels some crackling popping in her ears at times. History of otitis media with placement of T tubes bilaterally.   Also history of septal deviation wishes to discuss management of her septum in the sheng MYRINGOTOMY, LASER-ASSISTED     • OOPHORECTOMY Right 04/21/2017   • OTHER Bilateral     KNEE SCOPE   • OTHER Left     ULNAR NERVE RELEASE   • OTHER Left     CARPAL TUNNEL RELEASE   • OTHER Right     ELBOW SCOPE   • OTHER Left     FOOT SURGERY   • OTHER ORQUIDEA Lips/teeth/gums - Normal. Tonsils - Normal. Oropharynx - Normal.   Ears Normal Inspection - Right: Normal, Left: Normal. Canal - Right: Normal, Left: Normal. TM - Right: Normal, Left: Normal.  T tubes in position bilaterally.    Skin Normal Inspection - Nor TID, Disp: , Rfl: 3  •  baclofen 10 MG Oral Tab, TK 1 T PO  Q 8 H PRN, Disp: , Rfl: 0  •  Fluticasone Propionate (FLONASE) 50 MCG/ACT Nasal Suspension, , Disp: , Rfl: 11  •  loratadine (CLARITIN) 10 MG Oral Tab, Take 1 tablet (10 mg total) by mouth daily. ,

## 2021-03-12 DIAGNOSIS — Z23 NEED FOR VACCINATION: ICD-10-CM

## 2021-08-30 ENCOUNTER — APPOINTMENT (OUTPATIENT)
Dept: GENERAL RADIOLOGY | Age: 46
End: 2021-08-30
Attending: NURSE PRACTITIONER
Payer: COMMERCIAL

## 2021-08-30 ENCOUNTER — HOSPITAL ENCOUNTER (OUTPATIENT)
Age: 46
Discharge: HOME | End: 2021-08-30
Payer: COMMERCIAL

## 2021-08-30 VITALS
HEART RATE: 86 BPM | TEMPERATURE: 98 F | DIASTOLIC BLOOD PRESSURE: 99 MMHG | OXYGEN SATURATION: 100 % | RESPIRATION RATE: 18 BRPM | SYSTOLIC BLOOD PRESSURE: 144 MMHG
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
## 2021-08-30 DIAGNOSIS — S60.222A CONTUSION OF LEFT HAND, INITIAL ENCOUNTER: ICD-10-CM

## 2021-08-30 PROCEDURE — 73130 X-RAY EXAM OF HAND: CPT | Performed by: NURSE PRACTITIONER

## 2021-08-30 PROCEDURE — 29125 APPL SHORT ARM SPLINT STATIC: CPT | Performed by: NURSE PRACTITIONER

## 2021-08-30 PROCEDURE — 99203 OFFICE O/P NEW LOW 30 MIN: CPT | Performed by: NURSE PRACTITIONER

## 2021-08-30 NOTE — ED INITIAL ASSESSMENT (HPI)
Pt here with complaints of right hand and nose pain,pt states she tripped going down some steps last night,pt went to [facility] last night and got an xray of her right hand and nose , pt states she was told she broke her nose and no fracture was

## 2021-09-10 ENCOUNTER — OFFICE VISIT (OUTPATIENT)
Dept: OTOLARYNGOLOGY | Facility: CLINIC | Age: 46
End: 2021-09-10
Payer: COMMERCIAL

## 2021-09-10 VITALS — HEIGHT: 65.5 IN | WEIGHT: 181 LBS | BODY MASS INDEX: 29.79 KG/M2

## 2021-09-10 DIAGNOSIS — J34.2 DEVIATED NASAL SEPTUM: Primary | ICD-10-CM

## 2021-09-10 PROCEDURE — 3008F BODY MASS INDEX DOCD: CPT | Performed by: OTOLARYNGOLOGY

## 2021-09-10 PROCEDURE — 99213 OFFICE O/P EST LOW 20 MIN: CPT | Performed by: OTOLARYNGOLOGY

## 2021-09-10 NOTE — PROGRESS NOTES
Lashae Adams is a 55year old female. Patient presents with:  Nose Problem: pt presents today for nose injury, pt had a recent fall at home and landed on the face. Ear Problem: pt would like to check ear tubing both ears.        HISTORY OF PRESENT is chronic.     4/27/18 last visit I started her on Singulair and Claritin and she has been using fluticasone.  Continue nasal congestion and postnasal drip with scratchiness and irritation to her throat.  She is throat clearing.  Occasional cough with aayush possible septal surgery in the near future.  She will be having orthopedic arthroscopic surgery in the near future as well.     7/10/2020 doing well up until recently she started having some ear pain right greater than left.  This occurred about a month ag she actually has pain within a matter of hours. Also feels some crackling popping in her ears at times. History of otitis media with placement of T tubes bilaterally.   Also history of septal deviation wishes to discuss management of her septum in the sheng ADENOIDECTOMY     • ANESTH,SURGERY OF SHOULDER Right    • CARPAL TUNNEL RELEASE     • CORTISONE INJECTION      x2 days a week   • HAND/FINGER SURGERY UNLISTED Right 2/2015    right thumb   • HAND/FINGER SURGERY UNLISTED Left 8/27/2015    left thumb and elb Normal Conjunctiva - Right: Normal, Left: Normal. Pupil - Right: Normal, Left: Normal. Fundus - Right: Normal, Left: Normal.   Neurological Normal Memory - Normal. Cranial nerves - Cranial nerves II through XII grossly intact.    Head/Face Normal Facial fea daily., Disp: 60 capsule, Rfl: 5  •  methocarbamol 750 MG Oral Tab, Take 1 tablet (750 mg total) by mouth daily. , Disp: 30 tablet, Rfl: 5  •  Betamethasone Dipropionate Aug 0.05 % External Ointment, Apply by topical route every day to the affected area(s);

## 2021-09-21 ENCOUNTER — TELEPHONE (OUTPATIENT)
Dept: FAMILY MEDICINE CLINIC | Facility: CLINIC | Age: 46
End: 2021-09-21

## 2021-09-21 NOTE — TELEPHONE ENCOUNTER
Trace Layne from [facility] stating they are sending over a  Legal request for Radiology imaging.    Please advise

## 2021-10-18 NOTE — TELEPHONE ENCOUNTER
Per Shalonda Carter from [address] [place] [address] will be faxing the legal request form to [employer] gave fax# [phone] to fax the form to the office. And please after completed the form fax it to them at [phone].

## 2021-11-09 ENCOUNTER — TELEPHONE (OUTPATIENT)
Dept: OTOLARYNGOLOGY | Facility: CLINIC | Age: 46
End: 2021-11-09

## 2021-11-09 DIAGNOSIS — S03.00XS DISLOCATION OF TEMPOROMANDIBULAR JOINT, SEQUELA: Primary | ICD-10-CM

## 2021-11-09 NOTE — TELEPHONE ENCOUNTER
Pt calling states needs new referral for PT for her TMJ please advise     [phone]   Fax # [phone]  Attn: Mai Maldonado

## 2022-03-21 ENCOUNTER — HOSPITAL ENCOUNTER (OUTPATIENT)
Dept: GENERAL RADIOLOGY | Age: 47
Discharge: HOME | End: 2022-03-21
Attending: PAIN MEDICINE
Payer: COMMERCIAL

## 2022-03-21 DIAGNOSIS — M53.3 SI (SACROILIAC) JOINT DYSFUNCTION: ICD-10-CM

## 2022-03-21 PROCEDURE — 72220 X-RAY EXAM SACRUM TAILBONE: CPT | Performed by: PAIN MEDICINE

## 2022-06-14 ENCOUNTER — OFFICE VISIT (OUTPATIENT)
Dept: OTOLARYNGOLOGY | Facility: CLINIC | Age: 47
End: 2022-06-14
Payer: COMMERCIAL

## 2022-06-14 DIAGNOSIS — H92.13 OTORRHEA OF BOTH EARS: Primary | ICD-10-CM

## 2022-06-14 PROCEDURE — 99213 OFFICE O/P EST LOW 20 MIN: CPT | Performed by: OTOLARYNGOLOGY

## 2022-06-14 RX ORDER — MODAFINIL 100 MG/1
100 TABLET ORAL EVERY MORNING
COMMUNITY
Start: 2022-06-09

## 2022-06-14 RX ORDER — LEVOFLOXACIN 500 MG/1
500 TABLET, FILM COATED ORAL EVERY 24 HOURS
Qty: 7 TABLET | Refills: 0 | Status: SHIPPED | OUTPATIENT
Start: 2022-06-14

## 2022-06-14 RX ORDER — TOBRAMYCIN AND DEXAMETHASONE 3; 1 MG/ML; MG/ML
3 SUSPENSION/ DROPS OPHTHALMIC EVERY 8 HOURS
Qty: 10 ML | Refills: 0 | Status: SHIPPED | OUTPATIENT
Start: 2022-06-14

## 2022-06-24 ENCOUNTER — OFFICE VISIT (OUTPATIENT)
Dept: OTOLARYNGOLOGY | Facility: CLINIC | Age: 47
End: 2022-06-24
Payer: COMMERCIAL

## 2022-06-24 VITALS — TEMPERATURE: 98 F

## 2022-06-24 DIAGNOSIS — H92.03 EAR PAIN, BILATERAL: ICD-10-CM

## 2022-06-24 DIAGNOSIS — H60.391 OTHER INFECTIVE ACUTE OTITIS EXTERNA OF RIGHT EAR: Primary | ICD-10-CM

## 2022-06-24 PROCEDURE — 99213 OFFICE O/P EST LOW 20 MIN: CPT | Performed by: OTOLARYNGOLOGY

## 2022-06-24 RX ORDER — DULOXETIN HYDROCHLORIDE 60 MG/1
CAPSULE, DELAYED RELEASE ORAL
COMMUNITY
Start: 2022-05-09

## 2022-06-24 RX ORDER — ALPRAZOLAM 1 MG/1
TABLET ORAL
COMMUNITY
Start: 2022-06-06

## 2022-07-07 ENCOUNTER — TELEPHONE (OUTPATIENT)
Dept: PHYSICAL THERAPY | Age: 47
End: 2022-07-07

## 2022-07-19 NOTE — PROGRESS NOTES
Arpan Landry is here just for consultation.   She is accompanied by her psychologist.    Patient says that after her surgery of laparotomy and right salpingo-oophorectomy she had a period in May following that she has had spotting in June and July and no [place]
done

## 2022-08-28 ENCOUNTER — APPOINTMENT (OUTPATIENT)
Dept: GENERAL RADIOLOGY | Age: 47
End: 2022-08-28
Attending: EMERGENCY MEDICINE
Payer: COMMERCIAL

## 2022-08-28 ENCOUNTER — HOSPITAL ENCOUNTER (OUTPATIENT)
Age: 47
Discharge: HOME | End: 2022-08-28
Payer: COMMERCIAL

## 2022-08-28 VITALS
DIASTOLIC BLOOD PRESSURE: 112 MMHG | WEIGHT: 185 LBS | HEART RATE: 98 BPM | BODY MASS INDEX: 30.82 KG/M2 | OXYGEN SATURATION: 99 % | RESPIRATION RATE: 16 BRPM | HEIGHT: 65 IN | SYSTOLIC BLOOD PRESSURE: 137 MMHG | TEMPERATURE: 98 F

## 2022-08-28 DIAGNOSIS — M25.511 ACUTE PAIN OF RIGHT SHOULDER: ICD-10-CM

## 2022-08-28 DIAGNOSIS — S09.90XA INJURY OF HEAD, INITIAL ENCOUNTER: ICD-10-CM

## 2022-08-28 DIAGNOSIS — S43.401A SPRAIN OF RIGHT SHOULDER, UNSPECIFIED SHOULDER SPRAIN TYPE, INITIAL ENCOUNTER: Primary | ICD-10-CM

## 2022-08-28 PROCEDURE — A4565 SLINGS: HCPCS | Performed by: EMERGENCY MEDICINE

## 2022-08-28 PROCEDURE — 99213 OFFICE O/P EST LOW 20 MIN: CPT | Performed by: EMERGENCY MEDICINE

## 2022-08-28 PROCEDURE — 73030 X-RAY EXAM OF SHOULDER: CPT | Performed by: EMERGENCY MEDICINE

## 2022-08-28 RX ORDER — METHOCARBAMOL 500 MG/1
500 TABLET, FILM COATED ORAL 3 TIMES DAILY PRN
Qty: 20 TABLET | Refills: 0 | Status: SHIPPED | OUTPATIENT
Start: 2022-08-28

## 2022-08-28 NOTE — ED INITIAL ASSESSMENT (HPI)
Pt presents with right shoulder pain s/p falling out of bed at 1130p last night. Pt reports striking top of head, no LOC. Pt also reports right shoulder pain. Pt has hx of scoliosis and arthritis. Right shoulder appears to be lower that left, pt is guarding/holding arm/shoulder.

## 2023-07-05 ENCOUNTER — HOSPITAL ENCOUNTER (OUTPATIENT)
Age: 48
Discharge: HOME | End: 2023-07-05
Payer: COMMERCIAL

## 2023-07-05 ENCOUNTER — APPOINTMENT (OUTPATIENT)
Dept: GENERAL RADIOLOGY | Age: 48
End: 2023-07-05
Attending: NURSE PRACTITIONER
Payer: COMMERCIAL

## 2023-07-05 VITALS
HEART RATE: 99 BPM | RESPIRATION RATE: 20 BRPM | DIASTOLIC BLOOD PRESSURE: 100 MMHG | SYSTOLIC BLOOD PRESSURE: 141 MMHG | TEMPERATURE: 98 F | OXYGEN SATURATION: 99 %

## 2023-07-05 DIAGNOSIS — M25.562 ACUTE PAIN OF LEFT KNEE: ICD-10-CM

## 2023-07-05 DIAGNOSIS — M23.92 KNEE INTERNAL DERANGEMENT, LEFT: Primary | ICD-10-CM

## 2023-07-05 PROCEDURE — 99213 OFFICE O/P EST LOW 20 MIN: CPT | Performed by: NURSE PRACTITIONER

## 2023-07-05 PROCEDURE — 73560 X-RAY EXAM OF KNEE 1 OR 2: CPT | Performed by: NURSE PRACTITIONER

## 2023-07-05 RX ORDER — TRAMADOL HYDROCHLORIDE 50 MG/1
TABLET ORAL EVERY 6 HOURS PRN
Qty: 10 TABLET | Refills: 0 | Status: SHIPPED | OUTPATIENT
Start: 2023-07-05 | End: 2023-07-10

## 2023-07-05 NOTE — ED INITIAL ASSESSMENT (HPI)
Pt c/o left knee pain and swelling, states has been hurting x 4-5 days, but today while walking felt knee \"buckled\". Hx knee surgery due to arthritis.

## 2024-02-29 ENCOUNTER — OFFICE VISIT (OUTPATIENT)
Dept: OTOLARYNGOLOGY | Facility: CLINIC | Age: 49
End: 2024-02-29
Payer: COMMERCIAL

## 2024-02-29 VITALS — BODY MASS INDEX: 31.65 KG/M2 | HEIGHT: 65 IN | WEIGHT: 190 LBS

## 2024-02-29 DIAGNOSIS — L29.9 EAR ITCH: Primary | ICD-10-CM

## 2024-02-29 PROCEDURE — 99213 OFFICE O/P EST LOW 20 MIN: CPT | Performed by: OTOLARYNGOLOGY

## 2024-02-29 PROCEDURE — 3008F BODY MASS INDEX DOCD: CPT | Performed by: OTOLARYNGOLOGY

## 2024-02-29 RX ORDER — BETAMETHASONE DIPROPIONATE 0.5 MG/G
OINTMENT, AUGMENTED TOPICAL
Qty: 15 G | Refills: 0 | Status: SHIPPED | OUTPATIENT
Start: 2024-02-29

## 2024-02-29 RX ORDER — CLARITHROMYCIN 500 MG/1
500 TABLET, COATED ORAL 2 TIMES DAILY
Qty: 20 TABLET | Refills: 0 | Status: SHIPPED | OUTPATIENT
Start: 2024-02-29
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
## 2024-04-08 ENCOUNTER — APPOINTMENT (OUTPATIENT)
Dept: GENERAL RADIOLOGY | Age: 49
End: 2024-04-08
Attending: NURSE PRACTITIONER
Payer: COMMERCIAL

## 2024-04-08 ENCOUNTER — HOSPITAL ENCOUNTER (OUTPATIENT)
Age: 49
Discharge: HOME | End: 2024-04-08
Payer: COMMERCIAL

## 2024-04-08 VITALS
TEMPERATURE: 98 F | OXYGEN SATURATION: 100 % | SYSTOLIC BLOOD PRESSURE: 137 MMHG | RESPIRATION RATE: 18 BRPM | DIASTOLIC BLOOD PRESSURE: 97 MMHG | HEART RATE: 85 BPM

## 2024-04-08 DIAGNOSIS — S99.921A INJURY OF RIGHT TOE, INITIAL ENCOUNTER: Primary | ICD-10-CM

## 2024-04-08 PROCEDURE — 99213 OFFICE O/P EST LOW 20 MIN: CPT | Performed by: NURSE PRACTITIONER

## 2024-04-08 PROCEDURE — 73660 X-RAY EXAM OF TOE(S): CPT | Performed by: NURSE PRACTITIONER

## 2024-04-08 PROCEDURE — 29550 STRAPPING OF TOES: CPT | Performed by: NURSE PRACTITIONER

## 2024-04-08 NOTE — ED PROVIDER NOTES
Patient Seen in: [facility]      History     Chief Complaint   Patient presents with    Toe Injury     Stated Complaint: Foot pain    Subjective:   48-year-old patient with medical conditions as noted below presents with complaints of pain, swelling and bruising to right fourth toe.  Pain with ambulation.   was at a martial arts tournament yesterday and was doing a move that causes for a pivot on the feet.   toe jammed against the mat.            Objective:   Past Medical History:   Diagnosis Date    Anxiety state     Arthritis     Asthma (HCC)     Calculus of kidney     Fibroids     Osteoarthritis     Ovarian cyst     Scoliosis     Visual impairment     WEARS GLASSES    White coat syndrome with high blood pressure without hypertension               Past Surgical History:   Procedure Laterality Date    ADENOIDECTOMY      ANESTH,SURGERY OF SHOULDER Right     CARPAL TUNNEL RELEASE      CORTISONE INJECTION      x2 days a week    HAND/FINGER SURGERY UNLISTED Right 2/2015    right thumb    HAND/FINGER SURGERY UNLISTED Left 8/27/2015    left thumb and elbow     HC IMPLANT EAR TUBES      multiple     HIP SURGERY      KNEE ARTHROSCOPY      KNEE SURGERY Bilateral     LAPAROSCOPY,PELVIC,BIOPSY  04/21/2017    Post op laparotomy and rt oophorectomy and salpingectomy done 04/21/2017 at [facility]     MYRINGOTOMY, LASER-ASSISTED      OOPHORECTOMY Right 04/21/2017    OTHER Bilateral     KNEE SCOPE    OTHER Left     ULNAR NERVE RELEASE    OTHER Left     CARPAL TUNNEL RELEASE    OTHER Right     ELBOW SCOPE    OTHER Left     FOOT SURGERY    OTHER SURGICAL HISTORY Left     knee surgery    PHYSICAL THERAPY TREATMENT      x2 days a week    SALPINGECTOMY Bilateral 04/21/2017    right/left salpingectomy and left ovarian cystectomy    T&A      TONSILLECTOMY                  Social History     Socioeconomic History    Marital status: Single   Tobacco Use    Smoking status: Never    Smokeless tobacco: Never   Vaping Use     Vaping Use: Never used   Substance and Sexual Activity    Alcohol use: Yes     Alcohol/week: 0.0 standard drinks of alcohol     Comment: RARE    Drug use: No              Review of Systems   Musculoskeletal:  Positive for joint swelling.   Neurological:  Negative for numbness.   All other systems reviewed and are negative.      Positive for stated complaint: Foot pain  Other systems are as noted in HPI.  Constitutional and vital signs reviewed.      All other systems reviewed and negative except as noted above.    Physical Exam     ED Triage Vitals [04/08/24 1238]   BP (!) 137/97   Pulse 85   Resp 18   Temp 98.2 °F (36.8 °C)   Temp src Temporal   SpO2 100 %   O2 Device None (Room air)       Current:BP (!) 137/97   Pulse 85   Temp 98.2 °F (36.8 °C) (Temporal)   Resp 18   SpO2 100%         Physical Exam  Constitutional:       General: Birgit Hill is not in acute distress.  Cardiovascular:      Rate and Rhythm: Normal rate and regular rhythm.   Pulmonary:      Effort: Pulmonary effort is normal.      Breath sounds: Normal breath sounds.   Musculoskeletal:      Right foot: Swelling and tenderness present.        Feet:    Skin:     General: Skin is warm.      Capillary Refill: Capillary refill takes less than 2 seconds.   Neurological:      Mental Status: Birgit Hill is alert and oriented to person, place, and time.   Psychiatric:         Behavior: Behavior is cooperative.               ED Course   Labs Reviewed - No data to display  XR TOE(S) (MIN 2 VIEWS), RIGHT 4TH (CPT=73660)    Result Date: 4/8/2024  CONCLUSION:  1. No acute appearing fracture or dislocation.  Moderate soft tissue swelling of the distal portion of the right 4th toe.    Dictated by (CST): Denver Grullon MD on 4/08/2024 at 12:47 PM     Finalized by (CST): Denver Grullon MD on 4/08/2024 at 12:48 PM                          [facility]            Medical Decision Making  Patient is well-appearing. In NAD  I discussed differentials with  patient including but not limited to sprain vs fracture  Xray independently reviewed and discussed with patient, negative fractures  Terry tape applied by Tech. Patient declined post op shoe  RICE  otc meds prn  Fu with PCP/Ortho. Return/ ED precautions discussed      Problems Addressed:  Injury of right toe, initial encounter: acute illness or injury    Amount and/or Complexity of Data Reviewed  Radiology: ordered. Decision-making details documented in ED Course.        Disposition and Plan     Clinical Impression:  1. Injury of right toe, initial encounter         Disposition:  Discharge  4/8/2024 12:58 pm    Follow-up:  Layne Gonzalez MD  [address] [place] [id number]  [phone]          [address] [address] [phone]              Medications Prescribed:  Discharge Medication List as of 4/8/2024  1:05 PM

## 2024-04-08 NOTE — ED INITIAL ASSESSMENT (HPI)
Pt was in a karate tournament yesterday and injured 4th right toe. Bruising and swelling noted. Painful to walk on.

## 2024-08-02 ENCOUNTER — OFFICE VISIT (OUTPATIENT)
Dept: OTOLARYNGOLOGY | Facility: CLINIC | Age: 49
End: 2024-08-02

## 2024-08-02 DIAGNOSIS — L29.9 EAR ITCH: Primary | ICD-10-CM

## 2024-08-02 DIAGNOSIS — H69.93 DYSFUNCTION OF BOTH EUSTACHIAN TUBES: ICD-10-CM

## 2024-08-02 PROCEDURE — 99213 OFFICE O/P EST LOW 20 MIN: CPT | Performed by: OTOLARYNGOLOGY

## 2024-08-02 RX ORDER — NEOMYCIN SULFATE, POLYMYXIN B SULFATE AND HYDROCORTISONE 10; 3.5; 1 MG/ML; MG/ML; [USP'U]/ML
3 SUSPENSION/ DROPS AURICULAR (OTIC) 3 TIMES DAILY
Qty: 1 EACH | Refills: 0 | Status: SHIPPED | OUTPATIENT
Start: 2024-08-02

## 2024-08-02 RX ORDER — BETAMETHASONE DIPROPIONATE 0.5 MG/G
OINTMENT, AUGMENTED TOPICAL
Qty: 15 G | Refills: 0 | Status: SHIPPED | OUTPATIENT
Start: 2024-08-02
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
## 2024-08-22 ENCOUNTER — OFFICE VISIT (OUTPATIENT)
Dept: OTOLARYNGOLOGY | Facility: CLINIC | Age: 49
End: 2024-08-22

## 2024-08-22 DIAGNOSIS — M26.609 TMJ (TEMPOROMANDIBULAR JOINT SYNDROME): Primary | ICD-10-CM

## 2024-08-22 DIAGNOSIS — L29.9 EAR ITCH: ICD-10-CM

## 2024-08-22 DIAGNOSIS — H69.93 DYSFUNCTION OF BOTH EUSTACHIAN TUBES: ICD-10-CM

## 2024-08-22 PROCEDURE — 99213 OFFICE O/P EST LOW 20 MIN: CPT | Performed by: OTOLARYNGOLOGY
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
## 2024-11-08 ENCOUNTER — OFFICE VISIT (OUTPATIENT)
Dept: OTOLARYNGOLOGY | Facility: CLINIC | Age: 49
End: 2024-11-08

## 2024-11-08 DIAGNOSIS — L29.9 EAR ITCH: Primary | ICD-10-CM

## 2024-11-08 DIAGNOSIS — H60.393 OTHER INFECTIVE ACUTE OTITIS EXTERNA OF BOTH EARS: ICD-10-CM

## 2024-11-08 RX ORDER — BETAMETHASONE DIPROPIONATE 0.5 MG/G
OINTMENT, AUGMENTED TOPICAL
Qty: 15 G | Refills: 0 | Status: SHIPPED | OUTPATIENT
Start: 2024-11-08

## 2024-11-08 RX ORDER — MUPIROCIN 20 MG/G
1 OINTMENT TOPICAL 3 TIMES DAILY
Qty: 1 EACH | Refills: 0 | Status: SHIPPED | OUTPATIENT
Start: 2024-11-08

## 2024-11-08 RX ORDER — TOBRAMYCIN AND DEXAMETHASONE 3; 1 MG/ML; MG/ML
3 SUSPENSION/ DROPS OPHTHALMIC EVERY 8 HOURS
Qty: 10 ML | Refills: 0 | Status: SHIPPED | OUTPATIENT
Start: 2024-11-08
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
## 2025-03-05 ENCOUNTER — TELEPHONE (OUTPATIENT)
Dept: OTOLARYNGOLOGY | Facility: CLINIC | Age: 50
End: 2025-03-05

## 2025-03-06 ENCOUNTER — OFFICE VISIT (OUTPATIENT)
Dept: OTOLARYNGOLOGY | Facility: CLINIC | Age: 50
End: 2025-03-06
Payer: COMMERCIAL

## 2025-03-06 ENCOUNTER — TELEPHONE (OUTPATIENT)
Dept: OTOLARYNGOLOGY | Facility: CLINIC | Age: 50
End: 2025-03-06

## 2025-03-06 DIAGNOSIS — S09.91XA INJURY OF EAR, INITIAL ENCOUNTER: Primary | ICD-10-CM

## 2025-03-06 PROCEDURE — 99213 OFFICE O/P EST LOW 20 MIN: CPT | Performed by: OTOLARYNGOLOGY

## 2025-03-06 RX ORDER — TRIAMCINOLONE ACETONIDE 0.25 MG/G
CREAM TOPICAL
COMMUNITY

## 2025-03-06 RX ORDER — ASPIRIN 81 MG/1
TABLET, COATED ORAL
COMMUNITY
Start: 2024-10-01

## 2025-03-06 RX ORDER — AMOXICILLIN 250 MG
CAPSULE ORAL
COMMUNITY

## 2025-03-06 RX ORDER — TRAMADOL HYDROCHLORIDE 50 MG/1
50 TABLET ORAL EVERY 6 HOURS PRN
COMMUNITY
Start: 2025-02-03

## 2025-03-06 RX ORDER — METHYLPREDNISOLONE 4 MG/1
TABLET ORAL
Qty: 21 TABLET | Refills: 0 | Status: SHIPPED | OUTPATIENT
Start: 2025-03-06

## 2025-03-06 RX ORDER — CLOBETASOL PROPIONATE 0.5 MG/ML
SOLUTION TOPICAL
COMMUNITY

## 2025-03-06 RX ORDER — PRAZOSIN HYDROCHLORIDE 1 MG/1
1 CAPSULE ORAL NIGHTLY
COMMUNITY

## 2025-03-06 RX ORDER — LEVOFLOXACIN 500 MG/1
TABLET, FILM COATED ORAL
COMMUNITY

## 2025-03-06 RX ORDER — LORAZEPAM 2 MG/1
1 TABLET ORAL AS NEEDED
COMMUNITY
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
## 2025-03-06 NOTE — TELEPHONE ENCOUNTER
Received a call from the call center, patient's phone  last night and therefore she didn't have an alarm to wake her up for her 0830 appt this morning with Dr. Vazquez.  I asked if we could add patient to the 10:10 am slot and Dr. Vazquez said that was fine.  Patient aware and is appreciative.

## 2025-05-06 NOTE — ED PROVIDER NOTES
Patient Seen in: [facility]      History   Patient presents with:  Arm or Hand Injury    Stated Complaint: FALL LT HEAD INJURY    HPI/Subjective:   Patient presents to the immediate care accompanied by self.   Patient reports last night she f ELBOW SCOPE   • OTHER Left     FOOT SURGERY   • OTHER SURGICAL HISTORY Left     knee surgery   • PHYSICAL THERAPY TREATMENT      x2 days a week   • SALPINGECTOMY Bilateral 04/21/2017    right/left salpingectomy and left ovarian cystectomy   • T&A     • TON appearance. She is normal weight. She is not ill-appearing, toxic-appearing or diaphoretic. HENT:      Head: Normocephalic.       Right Ear: Tympanic membrane, ear canal and external ear normal.      Left Ear: Tympanic membrane, ear canal and external ear PROCEDURE: XR HAND (MIN 3 VIEWS), LEFT (CPT=73130)         COMPARISON: None. INDICATIONS: Pain and swelling to entire left hand after a fall. Bruising     to palm of hand. Limited ROM. TECHNIQUE: 3 views were obtained.            FINDINGS: awake, alert, no acute distress. Patient follows all commands.   Patient has clear bilateral breath sounds, abdomen is soft, nontender, no guarding, no rebound tenderness, benign exam.  Patient has tenderness to the second, third and fourth metacarpal and no

## (undated) NOTE — MR AVS SNAPSHOT
[address] at [address], [place] [address]  [phone]               Thank you for choosing us for your health care visit with Lynn Bhakta MD.  We are glad to serve you and happy to provide you with Fluticasone Propionate 50 MCG/ACT Susp   Commonly known as:  FLONASE           * hydrocodone-acetaminophen 7.5-325 MG Tabs   TK 2 TS PO QHS   Commonly known as:  NORCO           * HYDROcodone-acetaminophen 5-325 MG Tabs   TAKE 1-2 TABLETS PO EVERY 6 HOURS

## (undated) NOTE — MR AVS SNAPSHOT
[facility]  [address], [address]  [phone]               Thank you for choosing us for your health care visit with Ronald Tolentino.  Glen Chapman MD.  We are glad to serve you and happy to provide you with this summary alprazolam 0.5 MG Tabs   Commonly known as:  XANAX           aspirin 325 MG Tabs   Take 325 mg by mouth daily.            baclofen 10 MG Tabs   TK 1 T PO  Q 8 H PRN   Commonly known as:  LIORESAL           CIPRODEX 0.3-0.1 % Susp   Generic drug:  ciproflox Call the [employer] for assistance with your inactive [employer] account    If you have questions, you can call [phone] to talk to our [facility] Staff. Remember, [employer] is NOT to be used for urgent needs. For medical emergencies, dial 911.     Vi

## (undated) NOTE — MR AVS SNAPSHOT
[address] at [place] [address]  [phone]               Thank you for choosing us for your health care visit with Carol Mandujano MD.  We are glad to serve you and happy to provide you with * HYDROcodone-acetaminophen 5-325 MG Tabs   TAKE 1-2 TABLETS PO EVERY 6 HOURS AS NEEDED. Commonly known as:  NORCO           Iron 325 (65 Fe) MG Tabs   Take 1 tablet by mouth 2 (two) times daily.            loratadine 10 MG Tabs   Take 1 tablet (10 mg

## (undated) NOTE — MR AVS SNAPSHOT
[address]  at [address], [place] [address]  [phone]               Thank you for choosing us for your health care visit with Catarina Duane, MD.  We are glad to serve you and happy to provide you with KYRIE UTD AA TID   Commonly known as:  VOLTAREN           FLOVENT DISKUS 250 MCG/BLIST Aepb   Generic drug:  Fluticasone   INL 1 PUFF PO BID UTD           Fluticasone Propionate 50 MCG/ACT Susp   Commonly known as:  FLONASE           * hydrocodone-acetaminop Visit [email or web address] to learn more. Educational Information     Healthy Diet and Regular Exercise  [employer] for making healthy food choices  -   Enjoy your food, but eat less.   Fully enjoy your food when ea

## (undated) NOTE — MR AVS SNAPSHOT
[address] at [address], [facility]  [phone]               Thank you for choosing us for your health care visit with Carolina Wong MD.  We are glad to serve you and happy to provide you with * PROAIR  (90 Base) MCG/ACT Aers   Generic drug:  Albuterol Sulfate HFA           alprazolam 0.5 MG Tabs   Commonly known as:  XANAX           Azelastine HCl 0.1 % Soln   2 sprays by Nasal route 2 (two) times daily.    Commonly known as:  ASTELIN medications prescribed for you. Read the directions carefully, and ask your doctor or other care provider to review them with you.             Today's Orders     US PELVIS (TRANSABDOMINAL AND TRANSVAGINAL) (CPT=76856/60538)    Complete by:  Feb 17, 2017 (Ap If you have questions, you can call [phone] to talk to our [facility] Staff. Remember, [employer] is NOT to be used for urgent needs. For medical emergencies, dial 911. Visit [email or web address] to learn more.            Visit [facility]

## (undated) NOTE — MR AVS SNAPSHOT
[address] at [place] [address]  [phone]               Thank you for choosing us for your health care visit with Cleveland Knight MD.  We are glad to serve you and happy to provide you with aspirin 325 MG Tabs   Take 325 mg by mouth daily.            baclofen 10 MG Tabs   TK 1 T PO  Q 8 H PRN   Commonly known as:  LIORESAL           CIPRODEX 0.3-0.1 % Susp   Generic drug:  ciprofloxacin-dexamethasone   INSTILL 4 DROPS INTO BOTH EARS BID your doctor or other care provider to review them with you. [employer]     Call the [employer] for assistance with your inactive [facility] account    If you have questions, you can call [phone] to talk to our [facility] Staff.  Remember, [facility]

## (undated) NOTE — MR AVS SNAPSHOT
After Visit Summary   2/17/2017    Pam Rob    MRN: [id number]           Visit Information        Provider Department Dept Phone    2/17/2017  2:15 PM Tesha Cortes MD [facility] Op [phone]      Your Vitals Were     BP Ht Wt BMI Fibroids, intramural   [[id number]]  -  Primary       Follow-up     Return in about 6 weeks (around 3/31/2017).       We Ordered the Following      Normal Orders This Visit    HPV DNA  HIGH RISK , THIN PREP COLLECTION [[id number] CUSTOM]     THINPREP PAP SMEAR B may be held responsible for payment in full if proper authorization is not acquired. Please contact the Patient Business Office at [phone] if you have any questions related to insurance coverage. Thank you.           February 17, 2017        Shira Gresham MD [facility] now offers Video Visits through [address] for adult and pediatric patients.   Video Visits are available Monday - Friday for many common conditions such as allergies, colds, cough, fever, rash, sore throat, headache and pink

## (undated) NOTE — MR AVS SNAPSHOT
[address] at [address], [place] [address]  [phone]               Thank you for choosing us for your health care visit with Tarun Diaz MD.  We are glad to serve you and happy to provide you with * hydrocodone-acetaminophen 7.5-325 MG Tabs   TK 2 TS PO QHS   Commonly known as:  NORCO           * HYDROcodone-acetaminophen 5-325 MG Tabs   TAKE 1-2 TABLETS PO EVERY 6 HOURS AS NEEDED.    Commonly known as:  NORCO           loratadine 10 MG Tabs   Take